# Patient Record
Sex: FEMALE | Race: WHITE | NOT HISPANIC OR LATINO | Employment: OTHER | URBAN - METROPOLITAN AREA
[De-identification: names, ages, dates, MRNs, and addresses within clinical notes are randomized per-mention and may not be internally consistent; named-entity substitution may affect disease eponyms.]

---

## 2018-09-12 ENCOUNTER — CONVERSION ENCOUNTER (OUTPATIENT)
Dept: NEUROLOGY | Facility: CLINIC | Age: 63
End: 2018-09-12

## 2018-09-12 ENCOUNTER — OFFICE VISIT CONVERTED (OUTPATIENT)
Dept: NEUROSURGERY | Facility: CLINIC | Age: 63
End: 2018-09-12
Attending: PHYSICIAN ASSISTANT

## 2019-03-07 ENCOUNTER — APPOINTMENT (OUTPATIENT)
Dept: CT IMAGING | Facility: HOSPITAL | Age: 64
End: 2019-03-07

## 2019-03-07 ENCOUNTER — HOSPITAL ENCOUNTER (INPATIENT)
Facility: HOSPITAL | Age: 64
LOS: 2 days | Discharge: HOME | End: 2019-03-09
Attending: EMERGENCY MEDICINE | Admitting: INTERNAL MEDICINE

## 2019-03-07 ENCOUNTER — APPOINTMENT (OUTPATIENT)
Dept: MRI IMAGING | Facility: HOSPITAL | Age: 64
End: 2019-03-07

## 2019-03-07 ENCOUNTER — APPOINTMENT (OUTPATIENT)
Dept: GENERAL RADIOLOGY | Facility: HOSPITAL | Age: 64
End: 2019-03-07

## 2019-03-07 DIAGNOSIS — I63.9 CEREBROVASCULAR ACCIDENT (CVA), UNSPECIFIED MECHANISM (HCC): Primary | ICD-10-CM

## 2019-03-07 DIAGNOSIS — R29.898 WEAKNESS OF LEFT LOWER EXTREMITY: ICD-10-CM

## 2019-03-07 DIAGNOSIS — I10 HYPERTENSION, UNSPECIFIED TYPE: ICD-10-CM

## 2019-03-07 DIAGNOSIS — R29.898 WEAKNESS OF LEFT UPPER EXTREMITY: ICD-10-CM

## 2019-03-07 LAB
ABO GROUP BLD: NORMAL
ALBUMIN SERPL-MCNC: 4.3 G/DL (ref 3.5–5.2)
ALBUMIN/GLOB SERPL: 1.4 G/DL
ALP SERPL-CCNC: 80 U/L (ref 39–117)
ALT SERPL W P-5'-P-CCNC: 32 U/L (ref 1–33)
ANION GAP SERPL CALCULATED.3IONS-SCNC: 11.7 MMOL/L
APTT PPP: 27.5 SECONDS (ref 22.7–35.4)
AST SERPL-CCNC: 27 U/L (ref 1–32)
BASOPHILS # BLD AUTO: 0.04 10*3/MM3 (ref 0–0.2)
BASOPHILS NFR BLD AUTO: 0.6 % (ref 0–1.5)
BILIRUB SERPL-MCNC: 0.4 MG/DL (ref 0.1–1.2)
BILIRUB UR QL STRIP: NEGATIVE
BLD GP AB SCN SERPL QL: NEGATIVE
BUN BLD-MCNC: 7 MG/DL (ref 8–23)
BUN/CREAT SERPL: 9 (ref 7–25)
CALCIUM SPEC-SCNC: 9.6 MG/DL (ref 8.6–10.5)
CHLORIDE SERPL-SCNC: 99 MMOL/L (ref 98–107)
CLARITY UR: CLEAR
CO2 SERPL-SCNC: 23.3 MMOL/L (ref 22–29)
COLOR UR: YELLOW
CREAT BLD-MCNC: 0.78 MG/DL (ref 0.57–1)
DEPRECATED RDW RBC AUTO: 45.2 FL (ref 37–54)
EOSINOPHIL # BLD AUTO: 0.02 10*3/MM3 (ref 0–0.4)
EOSINOPHIL NFR BLD AUTO: 0.3 % (ref 0.3–6.2)
ERYTHROCYTE [DISTWIDTH] IN BLOOD BY AUTOMATED COUNT: 13.2 % (ref 12.3–15.4)
GFR SERPL CREATININE-BSD FRML MDRD: 75 ML/MIN/1.73
GLOBULIN UR ELPH-MCNC: 3.1 GM/DL
GLUCOSE BLD-MCNC: 120 MG/DL (ref 65–99)
GLUCOSE BLDC GLUCOMTR-MCNC: 100 MG/DL (ref 70–130)
GLUCOSE BLDC GLUCOMTR-MCNC: 99 MG/DL (ref 70–130)
GLUCOSE UR STRIP-MCNC: NEGATIVE MG/DL
HCT VFR BLD AUTO: 44.1 % (ref 34–46.6)
HGB BLD-MCNC: 14.8 G/DL (ref 12–15.9)
HGB UR QL STRIP.AUTO: NEGATIVE
HOLD SPECIMEN: NORMAL
HOLD SPECIMEN: NORMAL
IMM GRANULOCYTES # BLD AUTO: 0.03 10*3/MM3 (ref 0–0.05)
IMM GRANULOCYTES NFR BLD AUTO: 0.4 % (ref 0–0.5)
INR PPP: 1.01 (ref 0.9–1.1)
KETONES UR QL STRIP: NEGATIVE
LEUKOCYTE ESTERASE UR QL STRIP.AUTO: NEGATIVE
LYMPHOCYTES # BLD AUTO: 1.57 10*3/MM3 (ref 0.7–3.1)
LYMPHOCYTES NFR BLD AUTO: 22.3 % (ref 19.6–45.3)
MAGNESIUM SERPL-MCNC: 2.1 MG/DL (ref 1.6–2.4)
MCH RBC QN AUTO: 31.6 PG (ref 26.6–33)
MCHC RBC AUTO-ENTMCNC: 33.6 G/DL (ref 31.5–35.7)
MCV RBC AUTO: 94.2 FL (ref 79–97)
MONOCYTES # BLD AUTO: 0.47 10*3/MM3 (ref 0.1–0.9)
MONOCYTES NFR BLD AUTO: 6.7 % (ref 5–12)
NEUTROPHILS # BLD AUTO: 4.91 10*3/MM3 (ref 1.4–7)
NEUTROPHILS NFR BLD AUTO: 69.7 % (ref 42.7–76)
NITRITE UR QL STRIP: NEGATIVE
NRBC BLD AUTO-RTO: 0 /100 WBC (ref 0–0)
PH UR STRIP.AUTO: <=5 [PH] (ref 5–8)
PLATELET # BLD AUTO: 228 10*3/MM3 (ref 140–450)
PMV BLD AUTO: 9.8 FL (ref 6–12)
POTASSIUM BLD-SCNC: 4.5 MMOL/L (ref 3.5–5.2)
PROT SERPL-MCNC: 7.4 G/DL (ref 6–8.5)
PROT UR QL STRIP: NEGATIVE
PROTHROMBIN TIME: 13.1 SECONDS (ref 11.7–14.2)
RBC # BLD AUTO: 4.68 10*6/MM3 (ref 3.77–5.28)
RH BLD: POSITIVE
SODIUM BLD-SCNC: 134 MMOL/L (ref 136–145)
SP GR UR STRIP: >=1.03 (ref 1–1.03)
T&S EXPIRATION DATE: NORMAL
TROPONIN T SERPL-MCNC: <0.01 NG/ML (ref 0–0.03)
TSH SERPL DL<=0.05 MIU/L-ACNC: 1.23 MIU/ML (ref 0.27–4.2)
UROBILINOGEN UR QL STRIP: NORMAL
WBC NRBC COR # BLD: 7.04 10*3/MM3 (ref 3.4–10.8)
WHOLE BLOOD HOLD SPECIMEN: NORMAL

## 2019-03-07 PROCEDURE — 80053 COMPREHEN METABOLIC PANEL: CPT | Performed by: EMERGENCY MEDICINE

## 2019-03-07 PROCEDURE — 0 IOPAMIDOL PER 1 ML: Performed by: EMERGENCY MEDICINE

## 2019-03-07 PROCEDURE — 86850 RBC ANTIBODY SCREEN: CPT | Performed by: EMERGENCY MEDICINE

## 2019-03-07 PROCEDURE — 70551 MRI BRAIN STEM W/O DYE: CPT

## 2019-03-07 PROCEDURE — 86901 BLOOD TYPING SEROLOGIC RH(D): CPT | Performed by: EMERGENCY MEDICINE

## 2019-03-07 PROCEDURE — 99285 EMERGENCY DEPT VISIT HI MDM: CPT

## 2019-03-07 PROCEDURE — 82565 ASSAY OF CREATININE: CPT

## 2019-03-07 PROCEDURE — 3E03317 INTRODUCTION OF OTHER THROMBOLYTIC INTO PERIPHERAL VEIN, PERCUTANEOUS APPROACH: ICD-10-PCS | Performed by: EMERGENCY MEDICINE

## 2019-03-07 PROCEDURE — 82962 GLUCOSE BLOOD TEST: CPT

## 2019-03-07 PROCEDURE — 84443 ASSAY THYROID STIM HORMONE: CPT | Performed by: EMERGENCY MEDICINE

## 2019-03-07 PROCEDURE — 85730 THROMBOPLASTIN TIME PARTIAL: CPT | Performed by: EMERGENCY MEDICINE

## 2019-03-07 PROCEDURE — 83735 ASSAY OF MAGNESIUM: CPT | Performed by: EMERGENCY MEDICINE

## 2019-03-07 PROCEDURE — 86900 BLOOD TYPING SEROLOGIC ABO: CPT | Performed by: EMERGENCY MEDICINE

## 2019-03-07 PROCEDURE — 70496 CT ANGIOGRAPHY HEAD: CPT

## 2019-03-07 PROCEDURE — 25010000002 ALTEPLASE PER 1 MG: Performed by: EMERGENCY MEDICINE

## 2019-03-07 PROCEDURE — 25010000002 ALTEPLASE PER 1 MG

## 2019-03-07 PROCEDURE — 0042T HC CT CEREBRAL PERFUSION W/WO CONTRAST: CPT

## 2019-03-07 PROCEDURE — 71045 X-RAY EXAM CHEST 1 VIEW: CPT

## 2019-03-07 PROCEDURE — 85025 COMPLETE CBC W/AUTO DIFF WBC: CPT | Performed by: EMERGENCY MEDICINE

## 2019-03-07 PROCEDURE — 85610 PROTHROMBIN TIME: CPT | Performed by: EMERGENCY MEDICINE

## 2019-03-07 PROCEDURE — 81003 URINALYSIS AUTO W/O SCOPE: CPT | Performed by: EMERGENCY MEDICINE

## 2019-03-07 PROCEDURE — 84484 ASSAY OF TROPONIN QUANT: CPT | Performed by: EMERGENCY MEDICINE

## 2019-03-07 PROCEDURE — 99233 SBSQ HOSP IP/OBS HIGH 50: CPT | Performed by: PSYCHIATRY & NEUROLOGY

## 2019-03-07 PROCEDURE — 93010 ELECTROCARDIOGRAM REPORT: CPT | Performed by: INTERNAL MEDICINE

## 2019-03-07 PROCEDURE — 25010000002 ONDANSETRON PER 1 MG: Performed by: INTERNAL MEDICINE

## 2019-03-07 PROCEDURE — 70498 CT ANGIOGRAPHY NECK: CPT

## 2019-03-07 PROCEDURE — 93005 ELECTROCARDIOGRAM TRACING: CPT | Performed by: EMERGENCY MEDICINE

## 2019-03-07 PROCEDURE — 25010000002 ONDANSETRON PER 1 MG

## 2019-03-07 RX ORDER — ONDANSETRON 2 MG/ML
4 INJECTION INTRAMUSCULAR; INTRAVENOUS EVERY 6 HOURS PRN
Status: DISCONTINUED | OUTPATIENT
Start: 2019-03-07 | End: 2019-03-09 | Stop reason: HOSPADM

## 2019-03-07 RX ORDER — SODIUM CHLORIDE 0.9 % (FLUSH) 0.9 %
3-10 SYRINGE (ML) INJECTION AS NEEDED
Status: DISCONTINUED | OUTPATIENT
Start: 2019-03-07 | End: 2019-03-09 | Stop reason: HOSPADM

## 2019-03-07 RX ORDER — ATORVASTATIN CALCIUM 80 MG/1
80 TABLET, FILM COATED ORAL NIGHTLY
Status: DISCONTINUED | OUTPATIENT
Start: 2019-03-07 | End: 2019-03-09 | Stop reason: HOSPADM

## 2019-03-07 RX ORDER — AMITRIPTYLINE HYDROCHLORIDE 25 MG/1
25 TABLET, FILM COATED ORAL NIGHTLY
COMMUNITY

## 2019-03-07 RX ORDER — SODIUM CHLORIDE 9 MG/ML
75 INJECTION, SOLUTION INTRAVENOUS CONTINUOUS
Status: DISCONTINUED | OUTPATIENT
Start: 2019-03-07 | End: 2019-03-08

## 2019-03-07 RX ORDER — ONDANSETRON 2 MG/ML
4 INJECTION INTRAMUSCULAR; INTRAVENOUS ONCE
Status: COMPLETED | OUTPATIENT
Start: 2019-03-07 | End: 2019-03-07

## 2019-03-07 RX ORDER — SODIUM CHLORIDE 0.9 % (FLUSH) 0.9 %
10 SYRINGE (ML) INJECTION AS NEEDED
Status: DISCONTINUED | OUTPATIENT
Start: 2019-03-07 | End: 2019-03-09 | Stop reason: HOSPADM

## 2019-03-07 RX ORDER — OMEPRAZOLE 20 MG/1
40 CAPSULE, DELAYED RELEASE ORAL DAILY
COMMUNITY

## 2019-03-07 RX ORDER — SODIUM CHLORIDE 9 MG/ML
100 INJECTION, SOLUTION INTRAVENOUS ONCE
Status: COMPLETED | OUTPATIENT
Start: 2019-03-07 | End: 2019-03-07

## 2019-03-07 RX ORDER — ASPIRIN 300 MG/1
300 SUPPOSITORY RECTAL DAILY
Status: DISCONTINUED | OUTPATIENT
Start: 2019-03-08 | End: 2019-03-08

## 2019-03-07 RX ORDER — ASPIRIN 325 MG
TABLET ORAL
Status: DISCONTINUED
Start: 2019-03-07 | End: 2019-03-07 | Stop reason: WASHOUT

## 2019-03-07 RX ORDER — ASPIRIN 325 MG
325 TABLET ORAL DAILY
Status: DISCONTINUED | OUTPATIENT
Start: 2019-03-08 | End: 2019-03-08

## 2019-03-07 RX ORDER — ACETAMINOPHEN 325 MG/1
650 TABLET ORAL EVERY 4 HOURS PRN
Status: DISCONTINUED | OUTPATIENT
Start: 2019-03-07 | End: 2019-03-09 | Stop reason: HOSPADM

## 2019-03-07 RX ORDER — SODIUM CHLORIDE 0.9 % (FLUSH) 0.9 %
3 SYRINGE (ML) INJECTION EVERY 12 HOURS SCHEDULED
Status: DISCONTINUED | OUTPATIENT
Start: 2019-03-07 | End: 2019-03-09 | Stop reason: HOSPADM

## 2019-03-07 RX ORDER — ACETAMINOPHEN 325 MG/1
650 TABLET ORAL EVERY 4 HOURS PRN
Status: DISCONTINUED | OUTPATIENT
Start: 2019-03-07 | End: 2019-03-07 | Stop reason: SDUPTHER

## 2019-03-07 RX ADMIN — SODIUM CHLORIDE 500 ML: 9 INJECTION, SOLUTION INTRAVENOUS at 13:09

## 2019-03-07 RX ADMIN — ONDANSETRON HYDROCHLORIDE 4 MG: 2 SOLUTION INTRAMUSCULAR; INTRAVENOUS at 16:53

## 2019-03-07 RX ADMIN — ONDANSETRON 4 MG: 2 INJECTION INTRAMUSCULAR; INTRAVENOUS at 12:57

## 2019-03-07 RX ADMIN — SODIUM CHLORIDE, PRESERVATIVE FREE 3 ML: 5 INJECTION INTRAVENOUS at 19:58

## 2019-03-07 RX ADMIN — IOPAMIDOL 150 ML: 755 INJECTION, SOLUTION INTRAVENOUS at 11:38

## 2019-03-07 RX ADMIN — ATORVASTATIN CALCIUM 80 MG: 80 TABLET, FILM COATED ORAL at 20:00

## 2019-03-07 RX ADMIN — SODIUM CHLORIDE, PRESERVATIVE FREE 3 ML: 5 INJECTION INTRAVENOUS at 14:16

## 2019-03-07 RX ADMIN — ALTEPLASE 6.35 MG: KIT at 11:50

## 2019-03-07 RX ADMIN — SODIUM CHLORIDE 75 ML/HR: 9 INJECTION, SOLUTION INTRAVENOUS at 14:16

## 2019-03-07 RX ADMIN — Medication 3 ML: at 20:30

## 2019-03-07 RX ADMIN — ALTEPLASE 57.11 MG: KIT at 11:54

## 2019-03-07 RX ADMIN — SODIUM CHLORIDE, PRESERVATIVE FREE 3 ML: 5 INJECTION INTRAVENOUS at 20:29

## 2019-03-07 RX ADMIN — SODIUM CHLORIDE 100 ML/HR: 9 INJECTION, SOLUTION INTRAVENOUS at 12:18

## 2019-03-07 NOTE — PLAN OF CARE
Problem: Patient Care Overview  Goal: Plan of Care Review  Outcome: Ongoing (interventions implemented as appropriate)   03/07/19 1802   Coping/Psychosocial   Plan of Care Reviewed With patient   Plan of Care Review   Progress improving   Coping/Psychosocial   Patient Agreement with Plan of Care agrees   OTHER   Outcome Summary Patient still having weakness on the left side of the body, states tingling and loss of sensation to left side of body, patient alert and oriented, slow to answer some questions, answers questions correctly, hemodynamically stable at this time, no new deficits seen after TPA given, will continue monitoring.

## 2019-03-07 NOTE — ED NOTES
"Provider at bedside at time of arrival.     Team d paged at this time     Pt reports onset of symptoms 0800 this morning \"felt like everything was leaving me\" in reference to her left side starting to feel weak       Yajaira Thompson, RN  03/07/19 5906    "

## 2019-03-07 NOTE — ED NOTES
Attempt to call report was unsuccessful. Will have icu rn call this rn back      Yajaira Thompson, RN  03/07/19 9546

## 2019-03-07 NOTE — ED TRIAGE NOTES
Patient to ED via EMS from PCP office, patient c/o left arm, left leg weakness and blurred vision onset 0800 today

## 2019-03-07 NOTE — H&P
.     Admission Note    Patient Identification:  Araceli Cantor  63 y.o.  female  1955  [id number]            CC: Weak left side    History of Present Illness:  Patient is a very nice 63-year-old female without much previous medical history other than reflux and hypertension.  She only took medications for reflux and amitriptyline.  She developed sudden onset left sided weakness.  She was evaluated in the emergency room with a negative CT head and CTA.  She was given TPA and recommended that she be admitted to the ICU.  I have been asked to admit her due to the need for frequent neuro checks.    On interview the patient has no difficulty with repetition of speech recalling facts date location and her name.  She complains of some weakness however is able to move bilateral extremities.  She says it is weaker on her left side.  No sniffing headache no change in her vision.  No chest pain or shortness of breath    Past Medical History:   Diagnosis Date   • Back pain        Past Surgical History:   Procedure Laterality Date   • APPENDECTOMY     •  SECTION     • EYE SURGERY     • GALLBLADDER SURGERY          Social History     Socioeconomic History   • Marital status:      Spouse name: Not on file   • Number of children: Not on file   • Years of education: Not on file   • Highest education level: Not on file   Social Needs   • Financial resource strain: Not on file   • Food insecurity - worry: Not on file   • Food insecurity - inability: Not on file   • Transportation needs - medical: Not on file   • Transportation needs - non-medical: Not on file   Occupational History   • Not on file   Tobacco Use   • Smoking status: Current Some Day Smoker   • Smokeless tobacco: Never Used   Substance and Sexual Activity   • Alcohol use: Yes   • Drug use: Defer   • Sexual activity: Defer   Other Topics Concern   • Not on file   Social History Narrative   • Not on file       History reviewed. No pertinent family  history.    Medications Prior to Admission   Medication Sig Dispense Refill Last Dose   • amitriptyline (ELAVIL) 25 MG tablet Take 25 mg by mouth Every Night.   3/6/2019 at Unknown time   • omeprazole (priLOSEC) 20 MG capsule Take 40 mg by mouth Daily.   3/6/2019 at Unknown time       No Known Allergies    Review of Systems:  CONSTITUTIONAL:  Denies fevers or chills  EYE:  No new vision changes  EAR:  No change in hearing  CARDIAC:  No chest pain  PULMONARY:  No productive cough or shortness of breath  GI:  No diarrhea, hematemesis or hematochezia,  RENAL:  No dysuria or urinary frequency  MUSCULOSKELETAL: Left-sided weakness  ENDOCRINE:  No heat or cold intolerance  INTEGUMENTARY: No skin rashes  NEUROLOGICAL: Left-sided weakness  PSYCHIATRIC:  No new anxiety or depression  12 system review of systems performed and all else negative    Physical Exam:  Vitals:  Vitals:    03/07/19 1720 03/07/19 1733 03/07/19 1803 03/07/19 1833   BP:  165/79 156/83 127/73   BP Location:       Pulse: 93 92 94 101   Resp:       Temp:       TempSrc:       SpO2: 95% 93% 94% 95%   Weight:       Height:               Body mass index is 34.35 kg/m².    Intake/Output Summary (Last 24 hours) at 3/7/2019 1855  Last data filed at 3/7/2019 1800  Gross per 24 hour   Intake 1360 ml   Output 700 ml   Net 660 ml       Exam:  GENERAL:  NAD, Aox3  HEENT:  EOMI, nonicteric sclera, no JVD  PULMONARY:    CTAB, no wheeze, no crackles, no rhonchi, symmetric air entry  CARDIAC:  RRR no MRG, S1 S2  ABD: Mild truncal obesity soft nontender BS+  EXT: no c/c/e, pulses symmetric 2+ bilaterally  NEURO:  CNs II-XII intact, no focal deficits  SKIN: no lesions, no rash  PSYCH: appropriate mood  LYMPH: no cervical LAD    Scheduled meds:    [START ON 3/8/2019] aspirin 325 mg Oral Daily   Or      [START ON 3/8/2019] aspirin 300 mg Rectal Daily   atorvastatin 80 mg Oral Nightly   sodium chloride 3 mL Intravenous Q12H   sodium chloride 3 mL Intravenous Q12H       Data  Review:   I reviewed the patient's medications and new clinical results.  Lab Results   Component Value Date    CALCIUM 9.6 03/07/2019     Results from last 7 days   Lab Units 03/07/19  1121 03/07/19  1120   AST (SGOT) U/L  --  27   MAGNESIUM mg/dL  --  2.1   SODIUM mmol/L  --  134*   POTASSIUM mmol/L  --  4.5   CHLORIDE mmol/L  --  99   CO2 mmol/L  --  23.3   BUN mg/dL  --  7*   CREATININE mg/dL  --  0.78   GLUCOSE mg/dL  --  120*   CALCIUM mg/dL  --  9.6   WBC 10*3/mm3 7.04  --    HEMOGLOBIN g/dL 14.8  --    PLATELETS 10*3/mm3 228  --    ALT (SGPT) U/L  --  32     Results from last 7 days   Lab Units 03/07/19  1120   TROPONIN T ng/mL <0.010         Estimated Creatinine Clearance: 64.7 mL/min (by C-G formula based on SCr of 0.78 mg/dL).    IMAGING:  I have reviewed all imaging studies since my last documentation.  Imaging Results (most recent)     Procedure Component Value Units Date/Time    CT Cerebral Perfusion With & Without Contrast [[id number]] Collected:  03/07/19 1308     Updated:  03/07/19 1308    Narrative:       CT ANGIOGRAM OF THE HEAD AND NECK WITH CONTRAST AND CT PERFUSION STUDY     CLINICAL HISTORY:Cerebral ischemia.     TECHNIQUE: The CT scan of the head was performed with 3 mm axial images  without the use of IV contrast. Subsequently, a CT perfusion study was  performed with a bolus administration of IV contrast and construction      standard perfusion maps. A CT angiogram of the head and neck was  performed with 1 mm axial images following administration of IV  contrast. Sagittal, coronal, and 3-dimensional reconstructed images were  obtained. Finally, a delayed postcontrast head CT was performed with 3  mm axial images.     FINDINGS:     NONCONTRAST HEAD CT: The noncontrast head CT demonstrates no evidence  for an area of acute completed infarction. The ventricles, sulci, and  cisterns are age appropriate. The gray-white matter differentiation is  within normal limits. The basal ganglia and  thalami are unremarkable in  appearance. The posterior fossa structures are within normal limits.     CT PERFUSION STUDY: No significant asymmetries are appreciated in either  the time to maximum enhancement or the cerebral blood flow maps.     CTA HEAD: There is no evidence for a large vessel occlusion. The  cavernous internal carotid arteries are remarkable for mild  atherosclerotic changes. The middle and anterior cerebral arteries are  unremarkable. The vertebral arteries, basilar artery, and posterior  cerebral arteries are within normal limits.     CTA NECK: There is a classic configuration of the aortic arch. There is  no significant great vessel origin stenosis. There is no significant  NASCET stenosis appreciated within either common carotid artery  bifurcation or proximal internal carotid. The vertebral arteries are  remarkable for a mild stenosis at the origin of the left vertebral.     Incidental note is made of a thyroid nodule within the left lobe  measuring up to 1.2 cm in diameter.        DELAYED POSTCONTRAST HEAD CT: No abnormal areas of contrast enhancement  are noted.       Impression:          There is no evidence for an acute completed infarct on either the  noncontrast head CT or the CT perfusion study.     There is no evidence for large vessel occlusion on the CT angiogram of  the head.     There is no NASCET stenosis within either common carotid artery  bifurcation or proximal internal carotid.     Mild stenosis is seen at the origin of the left vertebral artery.     Incidental note is made of an indeterminate hypodense nodule within the  left lobe of the thyroid measuring up to 1.2 cm in diameter. Further  evaluation is recommended with thyroid ultrasound along with potential  tissue sampling.     The findings of the noncontrast head CT were discussed with Dr. Bassett  on 03/07/2019 at approximately 11:29 AM. The findings of the CT  angiogram and CT perfusion study were discussed with   Serjio on  03/07/2019 at approximately 11:55 AM. The findings of the thyroid nodule  were discussed with .         Radiation dose reduction techniques were utilized, including automated  exposure control and exposure modulation based on body size.          CT Angiogram Neck With & Without Contrast [[id number]] Collected:  03/07/19 1308     Updated:  03/07/19 1308    Narrative:       CT ANGIOGRAM OF THE HEAD AND NECK WITH CONTRAST AND CT PERFUSION STUDY     CLINICAL HISTORY:Cerebral ischemia.     TECHNIQUE: The CT scan of the head was performed with 3 mm axial images  without the use of IV contrast. Subsequently, a CT perfusion study was  performed with a bolus administration of IV contrast and construction      standard perfusion maps. A CT angiogram of the head and neck was  performed with 1 mm axial images following administration of IV  contrast. Sagittal, coronal, and 3-dimensional reconstructed images were  obtained. Finally, a delayed postcontrast head CT was performed with 3  mm axial images.     FINDINGS:     NONCONTRAST HEAD CT: The noncontrast head CT demonstrates no evidence  for an area of acute completed infarction. The ventricles, sulci, and  cisterns are age appropriate. The gray-white matter differentiation is  within normal limits. The basal ganglia and thalami are unremarkable in  appearance. The posterior fossa structures are within normal limits.     CT PERFUSION STUDY: No significant asymmetries are appreciated in either  the time to maximum enhancement or the cerebral blood flow maps.     CTA HEAD: There is no evidence for a large vessel occlusion. The  cavernous internal carotid arteries are remarkable for mild  atherosclerotic changes. The middle and anterior cerebral arteries are  unremarkable. The vertebral arteries, basilar artery, and posterior  cerebral arteries are within normal limits.     CTA NECK: There is a classic configuration of the aortic arch. There is  no significant great  vessel origin stenosis. There is no significant  NASCET stenosis appreciated within either common carotid artery  bifurcation or proximal internal carotid. The vertebral arteries are  remarkable for a mild stenosis at the origin of the left vertebral.     Incidental note is made of a thyroid nodule within the left lobe  measuring up to 1.2 cm in diameter.        DELAYED POSTCONTRAST HEAD CT: No abnormal areas of contrast enhancement  are noted.       Impression:          There is no evidence for an acute completed infarct on either the  noncontrast head CT or the CT perfusion study.     There is no evidence for large vessel occlusion on the CT angiogram of  the head.     There is no NASCET stenosis within either common carotid artery  bifurcation or proximal internal carotid.     Mild stenosis is seen at the origin of the left vertebral artery.     Incidental note is made of an indeterminate hypodense nodule within the  left lobe of the thyroid measuring up to 1.2 cm in diameter. Further  evaluation is recommended with thyroid ultrasound along with potential  tissue sampling.     The findings of the noncontrast head CT were discussed with Dr. Bassett  on 03/07/2019 at approximately 11:29 AM. The findings of the CT  angiogram and CT perfusion study were discussed with Dr. Bassett on  03/07/2019 at approximately 11:55 AM. The findings of the thyroid nodule  were discussed with .         Radiation dose reduction techniques were utilized, including automated  exposure control and exposure modulation based on body size.          CT Angiogram Head With & Without Contrast [[id number]] Collected:  03/07/19 1308     Updated:  03/07/19 1308    Narrative:       CT ANGIOGRAM OF THE HEAD AND NECK WITH CONTRAST AND CT PERFUSION STUDY     CLINICAL HISTORY:Cerebral ischemia.     TECHNIQUE: The CT scan of the head was performed with 3 mm axial images  without the use of IV contrast. Subsequently, a CT perfusion study  was  performed with a bolus administration of IV contrast and construction      standard perfusion maps. A CT angiogram of the head and neck was  performed with 1 mm axial images following administration of IV  contrast. Sagittal, coronal, and 3-dimensional reconstructed images were  obtained. Finally, a delayed postcontrast head CT was performed with 3  mm axial images.     FINDINGS:     NONCONTRAST HEAD CT: The noncontrast head CT demonstrates no evidence  for an area of acute completed infarction. The ventricles, sulci, and  cisterns are age appropriate. The gray-white matter differentiation is  within normal limits. The basal ganglia and thalami are unremarkable in  appearance. The posterior fossa structures are within normal limits.     CT PERFUSION STUDY: No significant asymmetries are appreciated in either  the time to maximum enhancement or the cerebral blood flow maps.     CTA HEAD: There is no evidence for a large vessel occlusion. The  cavernous internal carotid arteries are remarkable for mild  atherosclerotic changes. The middle and anterior cerebral arteries are  unremarkable. The vertebral arteries, basilar artery, and posterior  cerebral arteries are within normal limits.     CTA NECK: There is a classic configuration of the aortic arch. There is  no significant great vessel origin stenosis. There is no significant  NASCET stenosis appreciated within either common carotid artery  bifurcation or proximal internal carotid. The vertebral arteries are  remarkable for a mild stenosis at the origin of the left vertebral.     Incidental note is made of a thyroid nodule within the left lobe  measuring up to 1.2 cm in diameter.        DELAYED POSTCONTRAST HEAD CT: No abnormal areas of contrast enhancement  are noted.       Impression:          There is no evidence for an acute completed infarct on either the  noncontrast head CT or the CT perfusion study.     There is no evidence for large vessel occlusion on  the CT angiogram of  the head.     There is no NASCET stenosis within either common carotid artery  bifurcation or proximal internal carotid.     Mild stenosis is seen at the origin of the left vertebral artery.     Incidental note is made of an indeterminate hypodense nodule within the  left lobe of the thyroid measuring up to 1.2 cm in diameter. Further  evaluation is recommended with thyroid ultrasound along with potential  tissue sampling.     The findings of the noncontrast head CT were discussed with Dr. Bassett  on 03/07/2019 at approximately 11:29 AM. The findings of the CT  angiogram and CT perfusion study were discussed with Dr. Bassett on  03/07/2019 at approximately 11:55 AM. The findings of the thyroid nodule  were discussed with .         Radiation dose reduction techniques were utilized, including automated  exposure control and exposure modulation based on body size.          XR Chest 1 View [[id number]] Collected:  03/07/19 1245     Updated:  03/07/19 1249    Narrative:       CHEST 3/17/2019 AT 12:42 PM     CLINICAL HISTORY: Possible acute CVA. Rule out aspiration.     The lungs are well-expanded and are free of infiltrates. There are no  pleural effusions. The heart is normal in size.     IMPRESSIONS: No evidence of acute disease within the chest.     This report was finalized on 3/7/2019 12:46 PM by Dr. Anson Villafuerte M.D.             ASSESSMENT /   PLAN:  Hyponatremia  Hypertension  GERD  Hyperglycemia  Acute stroke right subcortical    Admit to the ICU frequent neurological monitoring goal blood pressure less than 180 systolic 110 diastolic per stroke team.  MRI and transthoracic echocardiogram ordered IV fluids.  Standard TPA orders.  Have discussed with bedside nurse in ICU we will give some as needed medications for nauseousness.    Total critical care time was 40 minutes, excluding any separately billable procedure time.    Jeremi Caballero MD  [facility]  03/07/19  6:58 PM

## 2019-03-07 NOTE — ED NOTES
Pt in ct on zoll monitor, +portable oxygen, and RNx3. Dr. Loco at bedside shortly after arrival to CT.        Yajaira Thompson, RN  03/07/19 0822

## 2019-03-07 NOTE — ED PROVIDER NOTES
" EMERGENCY DEPARTMENT ENCOUNTER    CHIEF COMPLAINT  Chief Complaint: left sided extremity weakness  History given by: patient  History limited by: none  Room Number: I371/1  PMD: System, Provider Not In      HPI:  Pt is a 63 y.o. female who presents complaining of left sided extremity weakness that started suddenly at 0800 this morning shortly after she woke up. Pt states she was sitting on the edge of her bed when her strength \"drained out of her\". Pt was last normal this morning when she woke up. Pt drove herself to see her doctor and was brought to the ER via ambulance from there. Pt c/o bilateral blurry vision and dizziness. Pt denies HA, rectal bleeding, any recent head injuries or surgeries, and any recent flu-like symptoms. Pt was slightly hypertensive en route to the ED, per EMS. Pt has hx of GERD. Pt denies any hx of aneurysm.     Duration:  Since 0800 this morning  Onset: sudden  Timing: constant  Location: L-sided extremities  Radiation: none  Quality: L-sided extremity weakness  Intensity/Severity: moderate  Progression: unchanged  Associated Symptoms: bilateral blurry vision and dizziness  Aggravating Factors: none  Alleviating Factors: none  Previous Episodes: none  Treatment before arrival: Pt drove herself to see her doctor and was brought to the ER via ambulance from there.    PAST MEDICAL HISTORY  Active Ambulatory Problems     Diagnosis Date Noted   • No Active Ambulatory Problems     Resolved Ambulatory Problems     Diagnosis Date Noted   • No Resolved Ambulatory Problems     Past Medical History:   Diagnosis Date   • Back pain        PAST SURGICAL HISTORY  Past Surgical History:   Procedure Laterality Date   • APPENDECTOMY     •  SECTION     • EYE SURGERY     • GALLBLADDER SURGERY         FAMILY HISTORY  History reviewed. No pertinent family history.    SOCIAL HISTORY  Social History     Socioeconomic History   • Marital status:      Spouse name: Not on file   • Number of " children: Not on file   • Years of education: Not on file   • Highest education level: Not on file   Social Needs   • Financial resource strain: Not on file   • Food insecurity - worry: Not on file   • Food insecurity - inability: Not on file   • Transportation needs - medical: Not on file   • Transportation needs - non-medical: Not on file   Occupational History   • Not on file   Tobacco Use   • Smoking status: Current Some Day Smoker   • Smokeless tobacco: Never Used   Substance and Sexual Activity   • Alcohol use: Yes   • Drug use: Defer   • Sexual activity: Defer   Other Topics Concern   • Not on file   Social History Narrative   • Not on file       ALLERGIES  Patient has no known allergies.    REVIEW OF SYSTEMS  Review of Systems   Constitutional: Negative for fever.   HENT: Negative for sore throat.    Eyes: Positive for visual disturbance ( bilateral blurry vision).   Respiratory: Negative for cough and shortness of breath.    Cardiovascular: Negative for chest pain.   Gastrointestinal: Negative for abdominal pain, diarrhea and vomiting.   Genitourinary: Negative for dysuria.   Musculoskeletal: Negative for neck pain.   Skin: Negative for rash.   Neurological: Positive for dizziness and weakness ( L-sided extremity weakness). Negative for numbness and headaches.   Hematological: Negative.    Psychiatric/Behavioral: Negative.    All other systems reviewed and are negative.      PHYSICAL EXAM  ED Triage Vitals   Temp Pulse Resp BP SpO2   -- -- -- -- --      Temp src Heart Rate Source Patient Position BP Location FiO2 (%)   -- -- -- -- --       Physical Exam   Constitutional: She is oriented to person, place, and time and well-developed, well-nourished, and in no distress. No distress.   HENT:   Mouth/Throat: Mucous membranes are normal.   Eyes: EOM are normal.   Cardiovascular: Normal rate and regular rhythm. Exam reveals no gallop and no friction rub.   No murmur heard.  Pulmonary/Chest: Effort normal. No  respiratory distress. She has no wheezes. She has no rhonchi. She has no rales.   Breath sounds are symmetric.   Abdominal: Soft. She exhibits no distension. There is no tenderness. There is no guarding.   Musculoskeletal: Normal range of motion. She exhibits no deformity.   Neurological: She is alert and oriented to person, place, and time. She has intact cranial nerves. She displays facial symmetry and normal speech.   moving all extremities, no focal deficits  There is weakness of L upper extremity and L lower extremity with pronator drift and mild decreased sensation to L upper extremity and L lower extremity as compared to R side. There is no significant ataxia. There is no aphasia or dysarthria. NIH scale of 5.     Skin: Skin is warm and dry.   Psychiatric:   Calm, cooperative       LAB RESULTS  Lab Results (last 24 hours)     Procedure Component Value Units Date/Time    Comprehensive Metabolic Panel [[id number]]  (Abnormal) Collected:  03/07/19 1120    Specimen:  Blood Updated:  03/07/19 1202     Glucose 120 mg/dL      BUN 7 mg/dL      Creatinine 0.78 mg/dL      Sodium 134 mmol/L      Potassium 4.5 mmol/L      Chloride 99 mmol/L      CO2 23.3 mmol/L      Calcium 9.6 mg/dL      Total Protein 7.4 g/dL      Albumin 4.30 g/dL      ALT (SGPT) 32 U/L      AST (SGOT) 27 U/L      Alkaline Phosphatase 80 U/L      Total Bilirubin 0.4 mg/dL      eGFR Non African Amer 75 mL/min/1.73      Globulin 3.1 gm/dL      A/G Ratio 1.4 g/dL      BUN/Creatinine Ratio 9.0     Anion Gap 11.7 mmol/L     Narrative:       GFR Normal >60  Chronic Kidney Disease <60  Kidney Failure <15    Troponin [[id number]]  (Normal) Collected:  03/07/19 1120    Specimen:  Blood Updated:  03/07/19 1208     Troponin T <0.010 ng/mL     Narrative:       Troponin T Reference Range:  <= 0.03 ng/mL-   Negative for AMI  >0.03 ng/mL-     Abnormal for myocardial necrosis.  Clinicians would have to utilize clinical acumen, EKG, Troponin and serial changes to  determine if it is an Acute Myocardial Infarction or myocardial injury due to an underlying chronic condition.     Magnesium [[id number]]  (Normal) Collected:  03/07/19 1120    Specimen:  Blood Updated:  03/07/19 1200     Magnesium 2.1 mg/dL     TSH [[id number]]  (Normal) Collected:  03/07/19 1120    Specimen:  Blood Updated:  03/07/19 1212     TSH 1.230 mIU/mL     CBC & Differential [[id number]] Collected:  03/07/19 1121    Specimen:  Blood Updated:  03/07/19 1143    Narrative:       The following orders were created for panel order CBC & Differential.  Procedure                               Abnormality         Status                     ---------                               -----------         ------                     CBC Auto Differential[[id number]]        Normal              Final result                 Please view results for these tests on the individual orders.    Protime-INR [[id number]]  (Normal) Collected:  03/07/19 1121    Specimen:  Blood Updated:  03/07/19 1150     Protime 13.1 Seconds      INR 1.01    aPTT [[id number]]  (Normal) Collected:  03/07/19 1121    Specimen:  Blood Updated:  03/07/19 1150     PTT 27.5 seconds     CBC Auto Differential [[id number]]  (Normal) Collected:  03/07/19 1121    Specimen:  Blood Updated:  03/07/19 1143     WBC 7.04 10*3/mm3      RBC 4.68 10*6/mm3      Hemoglobin 14.8 g/dL      Hematocrit 44.1 %      MCV 94.2 fL      MCH 31.6 pg      MCHC 33.6 g/dL      RDW 13.2 %      RDW-SD 45.2 fl      MPV 9.8 fL      Platelets 228 10*3/mm3      Neutrophil % 69.7 %      Lymphocyte % 22.3 %      Monocyte % 6.7 %      Eosinophil % 0.3 %      Basophil % 0.6 %      Immature Grans % 0.4 %      Neutrophils, Absolute 4.91 10*3/mm3      Lymphocytes, Absolute 1.57 10*3/mm3      Monocytes, Absolute 0.47 10*3/mm3      Eosinophils, Absolute 0.02 10*3/mm3      Basophils, Absolute 0.04 10*3/mm3      Immature Grans, Absolute 0.03 10*3/mm3      nRBC 0.0 /100 WBC           I ordered the above labs and  reviewed the results    RADIOLOGY  CT Cerebral Perfusion With & Without Contrast   Preliminary Result       There is no evidence for an acute completed infarct on either the   noncontrast head CT or the CT perfusion study.       There is no evidence for large vessel occlusion on the CT angiogram of   the head.       There is no NASCET stenosis within either common carotid artery   bifurcation or proximal internal carotid.       Mild stenosis is seen at the origin of the left vertebral artery.       Incidental note is made of an indeterminate hypodense nodule within the   left lobe of the thyroid measuring up to 1.2 cm in diameter. Further   evaluation is recommended with thyroid ultrasound along with potential   tissue sampling.       The findings of the noncontrast head CT were discussed with Dr. Bassett   on 03/07/2019 at approximately 11:29 AM. The findings of the CT   angiogram and CT perfusion study were discussed with Dr. Bassett on   03/07/2019 at approximately 11:55 AM. The findings of the thyroid nodule   were discussed with .           Radiation dose reduction techniques were utilized, including automated   exposure control and exposure modulation based on body size.              CT Angiogram Neck With & Without Contrast   Preliminary Result       There is no evidence for an acute completed infarct on either the   noncontrast head CT or the CT perfusion study.       There is no evidence for large vessel occlusion on the CT angiogram of   the head.       There is no NASCET stenosis within either common carotid artery   bifurcation or proximal internal carotid.       Mild stenosis is seen at the origin of the left vertebral artery.       Incidental note is made of an indeterminate hypodense nodule within the   left lobe of the thyroid measuring up to 1.2 cm in diameter. Further   evaluation is recommended with thyroid ultrasound along with potential   tissue sampling.       The findings of the  noncontrast head CT were discussed with Dr. Bassett   on 03/07/2019 at approximately 11:29 AM. The findings of the CT   angiogram and CT perfusion study were discussed with Dr. Bassett on   03/07/2019 at approximately 11:55 AM. The findings of the thyroid nodule   were discussed with            Radiation dose reduction techniques were utilized, including automated   exposure control and exposure modulation based on body size.              CT Angiogram Head With & Without Contrast   Preliminary Result       There is no evidence for an acute completed infarct on either the   noncontrast head CT or the CT perfusion study.       There is no evidence for large vessel occlusion on the CT angiogram of   the head.       There is no NASCET stenosis within either common carotid artery   bifurcation or proximal internal carotid.       Mild stenosis is seen at the origin of the left vertebral artery.       Incidental note is made of an indeterminate hypodense nodule within the   left lobe of the thyroid measuring up to 1.2 cm in diameter. Further   evaluation is recommended with thyroid ultrasound along with potential   tissue sampling.       The findings of the noncontrast head CT were discussed with Dr. Bassett   on 03/07/2019 at approximately 11:29 AM. The findings of the CT   angiogram and CT perfusion study were discussed with Dr. Bassett on   03/07/2019 at approximately 11:55 AM. The findings of the thyroid nodule   were discussed with .           Radiation dose reduction techniques were utilized, including automated   exposure control and exposure modulation based on body size.              XR Chest 1 View   Final Result   IMPRESSIONS: No evidence of acute disease within the chest.   CT Head Without Contrast    (Results Pending)   MRI Brain Without Contrast    (Results Pending)        I ordered the above noted radiological studies. Interpreted by radiologist. Discussed with radiologist Dr. Cunningham. Pt has  incidental L thyroid nodule. He recommends follow up imaging and biopsy as needed. Reviewed by me in PACS.       PROCEDURES  Critical Care  Performed by: Devang Carranza MD  Authorized by: Devang Carranza MD     Critical care provider statement:     Critical care time (minutes):  38    Critical care time was exclusive of:  Separately billable procedures and treating other patients    Critical care was necessary to treat or prevent imminent or life-threatening deterioration of the following conditions:  CNS failure or compromise    Critical care was time spent personally by me on the following activities:  Development of treatment plan with patient or surrogate, discussions with consultants, evaluation of patient's response to treatment, examination of patient, obtaining history from patient or surrogate, review of old charts, re-evaluation of patient's condition, pulse oximetry, ordering and review of radiographic studies, ordering and performing treatments and interventions and ordering and review of laboratory studies    I assumed direction of critical care for this patient from another provider in my specialty: no        EKG          EKG time: 1227  Rhythm/Rate: NSR, rate 96  P waves and VT: nl  QRS, axis: nl  No acute ischemic changes and no STEMI.  ST and T waves: nl    Interpreted Contemporaneously by me, independently viewed. Unchanged compared to prior in July 1999.       PROGRESS AND CONSULTS     1115 Team D called and pt is a TPA candidate.     1121 CXR, EKG, CT Cerebral Perfusion, CTA Neck, CTA Head, Labs, and Troponin ordered for further evaluation. Call placed to Neurology.     1148 I spoke with Dr. Bassett (Neurologist) here in the ED. Dr. Bassett agrees that the pt is a TPA candidate and agrees with the plan to admit the pt to the ICU.     1149 Activase and IVF ordered for further tx and symptom management.     1154 Call placed to Pulmonology.     1223 Received a call from Dr. Caballero (Pulmonologist)  and discussed patient's case. Dr. Caballero agrees with the plan to admit the pt.     1225 Rechecked pt. Pt is well appearing and shows symptomatic improvement. Pt is alert and oriented. Discussed the plan to admit the pt. Pt understands and agrees with the plan, all questions answered.    1227 I spoke with pt's family in the hallway. I told the pt that her imaging studies showed no evidence of major stroke, but she likely had a minor stroke. All questions addressed.       MEDICAL DECISION MAKING  Results were reviewed/discussed with the patient and they were also made aware of online access. Pt also made aware that some labs, such as cultures, will not be resulted during ER visit and follow up with PMD is necessary.     MDM  Number of Diagnoses or Management Options  Cerebrovascular accident (CVA), unspecified mechanism (CMS/HCC):   Hypertension, unspecified type:   Weakness of left lower extremity:   Weakness of left upper extremity:   Diagnosis management comments: Patient with stroke-like symptoms presenting within the tPA window and without absolute contraindications. Neurology consulted immediately, and after initially unremarkable CT scans recommended tPA. Patient did show improvement in strength and blurry vision during her ED stay after tPA, and remains well-appearing without mental status changes. She has been hospitalized to the ICU. Patient and family updated on the course and plan and need for hospitalization.        Amount and/or Complexity of Data Reviewed  Clinical lab tests: ordered and reviewed (Troponin - <0.010)  Tests in the radiology section of CPT®: ordered and reviewed (CTA Neck - There is no evidence for an acute completed infarct on either the noncontrast head CT or the CT perfusion study.  )  Tests in the medicine section of CPT®: ordered and reviewed (EKG - see procedure note)  Discussion of test results with the performing providers: yes (Dr. Cunningham (Radiologist))  Obtain history from  someone other than the patient: yes (EMS)  Discuss the patient with other providers: yes (Dr. Bassett (Neurologist)   Dr. Caballero (Pulmonologist))  Independent visualization of images, tracings, or specimens: yes    Critical Care  Total time providing critical care: 30-74 minutes (38 minutes)         DIAGNOSIS  Final diagnoses:   Cerebrovascular accident (CVA), unspecified mechanism (CMS/HCC)   Weakness of left lower extremity   Weakness of left upper extremity   Hypertension, unspecified type       DISPOSITION  ADMISSION    Discussed treatment plan and reason for admission with pt/family and admitting physician.  Pt/family voiced understanding of the plan for admission for further testing/treatment as needed.         Latest Documented Vital Signs:  As of 2:06 PM  BP- (!) 188/105 HR- 89 Temp- 98.1 °F (36.7 °C) (Oral) O2 sat- 99%    --  Documentation assistance provided by brittney Castro for Dr. Carranza.  Information recorded by the scribe was done at my direction and has been verified and validated by me.          Jose Francisco Castro  03/07/19 0914       Devang Carranza MD  03/07/19 1417

## 2019-03-07 NOTE — CONSULTS
Neurology Consult Note    Consult Date: 3/7/2019    Referring MD: Jeremi Caballero MD    Reason for Consult I have been asked to see the patient in neurological consultation to render advice and opinion regarding acute stroke    Araceli Cantor is a 63 y.o. female with past medical history of hypertension, GERD, no prior history of stroke.  She presented with last known normal at 8 AM when she developed sudden onset of left arm and leg weakness.  She felt a sudden sensation come over her and then noticed she was dragging the leg and felt her left arm was clumsy.  She presented to the emergency department was found to have left-sided weakness with no speech problems, no neglect.  She was taken for team D imaging, CT head was negative, CTA, CT perfusion were negative.  TPA was given for suspected acute subcortical stroke.  She denies any associated vision loss or language difficulty.  She denies any heart palpitations or cardiac history in the past.    Past Medical/Surgical Hx:  Past Medical History:   Diagnosis Date   • Back pain      Past Surgical History:   Procedure Laterality Date   • APPENDECTOMY     •  SECTION     • EYE SURGERY     • GALLBLADDER SURGERY         Medications On Admission  Elavil 25 mg daily  Prilosec 20 mg daily    Allergies:  No Known Allergies    Social Hx:  Social History     Socioeconomic History   • Marital status:      Spouse name: Not on file   • Number of children: Not on file   • Years of education: Not on file   • Highest education level: Not on file   Social Needs   • Financial resource strain: Not on file   • Food insecurity - worry: Not on file   • Food insecurity - inability: Not on file   • Transportation needs - medical: Not on file   • Transportation needs - non-medical: Not on file   Occupational History   • Not on file   Tobacco Use   • Smoking status: Current Some Day Smoker   • Smokeless tobacco: Never Used   Substance and Sexual Activity   • Alcohol use:  "Yes   • Drug use: Defer   • Sexual activity: Defer   Other Topics Concern   • Not on file   Social History Narrative   • Not on file       Family Hx:  History reviewed. No pertinent family history.    Review of Systems  Constitutional: [No fevers, chills]  Eye: [No recent visual problems, eye discharge]  HEENT: [No ear pain, nasal congestion]  Respiratory: [No shortness of breath, cough]  Cardiovascular: [No Chest pain, palpitations]  Gastrointestinal: [No nausea, vomiting]  Genitourinary: [No hematuria, incontinence]  Hema/Lymph: [no nosebleeds, history of anticoagulation]  Endocrine: [Negative for excessive urination, heat or cold intolerance]  Musculoskeletal: [No back pain, neck pain]  Integumentary: [No rash, pruritus]  Neurologic: [+ Weakness, numbness]  Psychiatric: [No anxiety, depression]    Exam    BP (!) 165/120   Pulse (!) 15   Temp 98.5 °F (36.9 °C)   Resp 18   Ht 142.2 cm (56\")   Wt 70.5 kg (155 lb 6.8 oz)   SpO2 99%   BMI 34.85 kg/m²   gen: NAD, vitals reviewed  Eyes: fundus sharp with no papilledema or retinal hemorrhages  CVS: RRR, S1, S2  MS: oriented x3, recent/remote memory intact, normal attention/concentration, language intact, no neglect, normal fund of knowledge  CN: visual acuity grossly normal, visual fields full, PERRL, EOMI, facial sensation equal, no facial droop, hearing symmetric, palate elevates symmetrically, shoulder shrug equal, tongue midline  Motor: 4+/5 left arm and leg, normal tone, 5/5 right upper and lower extremity, normal tone e  Sensation: intact to vibration and temperature throughout  Reflexes: 2+ right upper and lower extremity, right plantar downgoing, 1+ left upper and lower extremity, left plantar mute  Coordination: Left upper extremity dysmetria  Gait: Unable to walk due to left-sided weakness    DATA:    Lab Results   Component Value Date    GLUCOSE 120 (H) 03/07/2019    CALCIUM 9.6 03/07/2019     (L) 03/07/2019    K 4.5 03/07/2019    CO2 23.3 " 03/07/2019    CL 99 03/07/2019    BUN 7 (L) 03/07/2019    CREATININE 0.78 03/07/2019    EGFRIFNONA 75 03/07/2019    BCR 9.0 03/07/2019    ANIONGAP 11.7 03/07/2019     Lab Results   Component Value Date    WBC 7.04 03/07/2019    HGB 14.8 03/07/2019    HCT 44.1 03/07/2019    MCV 94.2 03/07/2019     03/07/2019     No results found for: CHOL  No results found for: HDL  No results found for: LDL  No results found for: TRIG  No results found for: HGBA1C  Lab Results   Component Value Date    INR 1.01 03/07/2019    PROTIME 13.1 03/07/2019       Lab review: CBC, BMP normal    Imaging review: CT head, CTA, CT perfusion personally reviewed and discussed with the reading neuroradiologist .  CT head shows no acute stroke, CTA shows no large vessel occlusion, CT perfusion negative.    Impression:  1) acute stroke, right subcortical, due to small vessel disease  2) essential hypertension  3) gastroesophageal reflux disease    Comment: Clinically she has an acute right subcortical stroke.  Plan is to give TPA and monitor in ICU.  Will obtain brain MRI later today.    PLAN:  1.  Status post TPA  2.  Goal blood pressure less than 180/110  3.  MRI brain without, 2D echo  4.  500 cc bolus, IV fluids

## 2019-03-08 ENCOUNTER — APPOINTMENT (OUTPATIENT)
Dept: CT IMAGING | Facility: HOSPITAL | Age: 64
End: 2019-03-08

## 2019-03-08 ENCOUNTER — APPOINTMENT (OUTPATIENT)
Dept: CARDIOLOGY | Facility: HOSPITAL | Age: 64
End: 2019-03-08

## 2019-03-08 LAB
AORTIC DIMENSIONLESS INDEX: 0.8 (DI)
BH CV ECHO MEAS - ACS: 1.7 CM
BH CV ECHO MEAS - AO MAX PG (FULL): 6.6 MMHG
BH CV ECHO MEAS - AO MAX PG: 11.4 MMHG
BH CV ECHO MEAS - AO MEAN PG (FULL): 3 MMHG
BH CV ECHO MEAS - AO MEAN PG: 5 MMHG
BH CV ECHO MEAS - AO ROOT AREA (BSA CORRECTED): 1.5
BH CV ECHO MEAS - AO ROOT AREA: 4.5 CM^2
BH CV ECHO MEAS - AO ROOT DIAM: 2.4 CM
BH CV ECHO MEAS - AO V2 MAX: 169 CM/SEC
BH CV ECHO MEAS - AO V2 MEAN: 106 CM/SEC
BH CV ECHO MEAS - AO V2 VTI: 28.4 CM
BH CV ECHO MEAS - ASC AORTA: 2.6 CM
BH CV ECHO MEAS - AVA(I,A): 2.4 CM^2
BH CV ECHO MEAS - AVA(I,D): 2.4 CM^2
BH CV ECHO MEAS - AVA(V,A): 2 CM^2
BH CV ECHO MEAS - AVA(V,D): 2 CM^2
BH CV ECHO MEAS - BSA(HAYCOCK): 1.7 M^2
BH CV ECHO MEAS - BSA: 1.6 M^2
BH CV ECHO MEAS - BZI_BMI: 34.3 KILOGRAMS/M^2
BH CV ECHO MEAS - BZI_METRIC_HEIGHT: 142.2 CM
BH CV ECHO MEAS - BZI_METRIC_WEIGHT: 69.4 KG
BH CV ECHO MEAS - EDV(CUBED): 97.3 ML
BH CV ECHO MEAS - EDV(MOD-SP2): 63 ML
BH CV ECHO MEAS - EDV(MOD-SP4): 82 ML
BH CV ECHO MEAS - EDV(TEICH): 97.3 ML
BH CV ECHO MEAS - EF(CUBED): 69.4 %
BH CV ECHO MEAS - EF(MOD-BP): 58 %
BH CV ECHO MEAS - EF(MOD-SP2): 55.6 %
BH CV ECHO MEAS - EF(MOD-SP4): 59.8 %
BH CV ECHO MEAS - EF(TEICH): 61 %
BH CV ECHO MEAS - ESV(CUBED): 29.8 ML
BH CV ECHO MEAS - ESV(MOD-SP2): 28 ML
BH CV ECHO MEAS - ESV(MOD-SP4): 33 ML
BH CV ECHO MEAS - ESV(TEICH): 37.9 ML
BH CV ECHO MEAS - FS: 32.6 %
BH CV ECHO MEAS - IVS/LVPW: 1
BH CV ECHO MEAS - IVSD: 0.9 CM
BH CV ECHO MEAS - LAT PEAK E' VEL: 9.6 CM/SEC
BH CV ECHO MEAS - LV DIASTOLIC VOL/BSA (35-75): 51.8 ML/M^2
BH CV ECHO MEAS - LV MASS(C)D: 137.7 GRAMS
BH CV ECHO MEAS - LV MASS(C)DI: 86.9 GRAMS/M^2
BH CV ECHO MEAS - LV MAX PG: 4.8 MMHG
BH CV ECHO MEAS - LV MEAN PG: 2 MMHG
BH CV ECHO MEAS - LV SYSTOLIC VOL/BSA (12-30): 20.8 ML/M^2
BH CV ECHO MEAS - LV V1 MAX: 110 CM/SEC
BH CV ECHO MEAS - LV V1 MEAN: 71.6 CM/SEC
BH CV ECHO MEAS - LV V1 VTI: 22.1 CM
BH CV ECHO MEAS - LVIDD: 4.6 CM
BH CV ECHO MEAS - LVIDS: 3.1 CM
BH CV ECHO MEAS - LVLD AP2: 6.4 CM
BH CV ECHO MEAS - LVLD AP4: 7.4 CM
BH CV ECHO MEAS - LVLS AP2: 5.5 CM
BH CV ECHO MEAS - LVLS AP4: 6.3 CM
BH CV ECHO MEAS - LVOT AREA (M): 3.1 CM^2
BH CV ECHO MEAS - LVOT AREA: 3.1 CM^2
BH CV ECHO MEAS - LVOT DIAM: 2 CM
BH CV ECHO MEAS - LVPWD: 0.9 CM
BH CV ECHO MEAS - MED PEAK E' VEL: 8.5 CM/SEC
BH CV ECHO MEAS - MV A DUR: 0.08 SEC
BH CV ECHO MEAS - MV A MAX VEL: 87.8 CM/SEC
BH CV ECHO MEAS - MV DEC SLOPE: 292 CM/SEC^2
BH CV ECHO MEAS - MV DEC TIME: 185 SEC
BH CV ECHO MEAS - MV E MAX VEL: 72.3 CM/SEC
BH CV ECHO MEAS - MV E/A: 0.82
BH CV ECHO MEAS - MV MAX PG: 3 MMHG
BH CV ECHO MEAS - MV MEAN PG: 1 MMHG
BH CV ECHO MEAS - MV P1/2T MAX VEL: 69.5 CM/SEC
BH CV ECHO MEAS - MV P1/2T: 69.7 MSEC
BH CV ECHO MEAS - MV V2 MAX: 87.3 CM/SEC
BH CV ECHO MEAS - MV V2 MEAN: 53.2 CM/SEC
BH CV ECHO MEAS - MV V2 VTI: 15.1 CM
BH CV ECHO MEAS - MVA P1/2T LCG: 3.2 CM^2
BH CV ECHO MEAS - MVA(P1/2T): 3.2 CM^2
BH CV ECHO MEAS - MVA(VTI): 4.6 CM^2
BH CV ECHO MEAS - PA ACC TIME: 0.08 SEC
BH CV ECHO MEAS - PA MAX PG (FULL): 3.1 MMHG
BH CV ECHO MEAS - PA MAX PG: 5.5 MMHG
BH CV ECHO MEAS - PA PR(ACCEL): 41 MMHG
BH CV ECHO MEAS - PA V2 MAX: 117 CM/SEC
BH CV ECHO MEAS - PULM A REVS DUR: 0.09 SEC
BH CV ECHO MEAS - PULM A REVS VEL: 28.6 CM/SEC
BH CV ECHO MEAS - PULM DIAS VEL: 39.3 CM/SEC
BH CV ECHO MEAS - PULM S/D: 1.3
BH CV ECHO MEAS - PULM SYS VEL: 50.9 CM/SEC
BH CV ECHO MEAS - RAP SYSTOLE: 3 MMHG
BH CV ECHO MEAS - RV MAX PG: 2.4 MMHG
BH CV ECHO MEAS - RV MEAN PG: 1.5 MMHG
BH CV ECHO MEAS - RV V1 MAX: 75 CM/SEC
BH CV ECHO MEAS - RV V1 MEAN: 55.7 CM/SEC
BH CV ECHO MEAS - RV V1 VTI: 14.8 CM
BH CV ECHO MEAS - SI(AO): 81.1 ML/M^2
BH CV ECHO MEAS - SI(CUBED): 42.6 ML/M^2
BH CV ECHO MEAS - SI(LVOT): 43.8 ML/M^2
BH CV ECHO MEAS - SI(MOD-SP2): 22.1 ML/M^2
BH CV ECHO MEAS - SI(MOD-SP4): 30.9 ML/M^2
BH CV ECHO MEAS - SI(TEICH): 37.5 ML/M^2
BH CV ECHO MEAS - SV(AO): 128.5 ML
BH CV ECHO MEAS - SV(CUBED): 67.5 ML
BH CV ECHO MEAS - SV(LVOT): 69.4 ML
BH CV ECHO MEAS - SV(MOD-SP2): 35 ML
BH CV ECHO MEAS - SV(MOD-SP4): 49 ML
BH CV ECHO MEAS - SV(TEICH): 59.4 ML
BH CV ECHO MEAS - TAPSE (>1.6): 1.8 CM2
BH CV ECHO MEASUREMENTS AVERAGE E/E' RATIO: 7.99
BH CV VAS BP RIGHT ARM: NORMAL MMHG
BH CV XLRA - RV BASE: 3.1 CM
BH CV XLRA - TDI S': 12 CM/SEC
CHOLEST SERPL-MCNC: 220 MG/DL (ref 0–200)
GLUCOSE BLDC GLUCOMTR-MCNC: 96 MG/DL (ref 70–130)
HBA1C MFR BLD: 5.2 % (ref 4.8–5.6)
HDLC SERPL-MCNC: 63 MG/DL (ref 40–60)
LDLC SERPL CALC-MCNC: 137 MG/DL (ref 0–100)
LDLC/HDLC SERPL: 2.17 {RATIO}
LEFT ATRIUM VOLUME INDEX: 21 ML/M2
TRIGL SERPL-MCNC: 102 MG/DL (ref 0–150)
VLDLC SERPL-MCNC: 20.4 MG/DL (ref 5–40)

## 2019-03-08 PROCEDURE — 70450 CT HEAD/BRAIN W/O DYE: CPT

## 2019-03-08 PROCEDURE — 97110 THERAPEUTIC EXERCISES: CPT

## 2019-03-08 PROCEDURE — 93306 TTE W/DOPPLER COMPLETE: CPT

## 2019-03-08 PROCEDURE — 97161 PT EVAL LOW COMPLEX 20 MIN: CPT

## 2019-03-08 PROCEDURE — 80061 LIPID PANEL: CPT | Performed by: PSYCHIATRY & NEUROLOGY

## 2019-03-08 PROCEDURE — 99233 SBSQ HOSP IP/OBS HIGH 50: CPT | Performed by: PSYCHIATRY & NEUROLOGY

## 2019-03-08 PROCEDURE — 92610 EVALUATE SWALLOWING FUNCTION: CPT

## 2019-03-08 PROCEDURE — 82962 GLUCOSE BLOOD TEST: CPT

## 2019-03-08 PROCEDURE — 83036 HEMOGLOBIN GLYCOSYLATED A1C: CPT | Performed by: PSYCHIATRY & NEUROLOGY

## 2019-03-08 PROCEDURE — 93306 TTE W/DOPPLER COMPLETE: CPT | Performed by: INTERNAL MEDICINE

## 2019-03-08 RX ORDER — AMITRIPTYLINE HYDROCHLORIDE 25 MG/1
25 TABLET, FILM COATED ORAL NIGHTLY
Status: DISCONTINUED | OUTPATIENT
Start: 2019-03-08 | End: 2019-03-09 | Stop reason: HOSPADM

## 2019-03-08 RX ORDER — PANTOPRAZOLE SODIUM 40 MG/1
40 TABLET, DELAYED RELEASE ORAL EVERY MORNING
Status: DISCONTINUED | OUTPATIENT
Start: 2019-03-08 | End: 2019-03-09 | Stop reason: HOSPADM

## 2019-03-08 RX ORDER — CLOPIDOGREL BISULFATE 75 MG/1
75 TABLET ORAL DAILY
Status: DISCONTINUED | OUTPATIENT
Start: 2019-03-08 | End: 2019-03-09 | Stop reason: HOSPADM

## 2019-03-08 RX ORDER — ASPIRIN 81 MG/1
81 TABLET, CHEWABLE ORAL DAILY
Status: DISCONTINUED | OUTPATIENT
Start: 2019-03-08 | End: 2019-03-09 | Stop reason: HOSPADM

## 2019-03-08 RX ADMIN — SODIUM CHLORIDE, PRESERVATIVE FREE 3 ML: 5 INJECTION INTRAVENOUS at 08:38

## 2019-03-08 RX ADMIN — ASPIRIN 81 MG: 81 TABLET, CHEWABLE ORAL at 15:57

## 2019-03-08 RX ADMIN — CLOPIDOGREL 75 MG: 75 TABLET, FILM COATED ORAL at 15:50

## 2019-03-08 RX ADMIN — PANTOPRAZOLE SODIUM 40 MG: 40 TABLET, DELAYED RELEASE ORAL at 18:04

## 2019-03-08 RX ADMIN — AMITRIPTYLINE HYDROCHLORIDE 25 MG: 25 TABLET, FILM COATED ORAL at 20:35

## 2019-03-08 RX ADMIN — ATORVASTATIN CALCIUM 80 MG: 80 TABLET, FILM COATED ORAL at 20:35

## 2019-03-08 RX ADMIN — SODIUM CHLORIDE 75 ML/HR: 9 INJECTION, SOLUTION INTRAVENOUS at 00:01

## 2019-03-08 NOTE — PROGRESS NOTES
"  Daily Progress Note.   [facility] INTENSIVE CARE  3/8/2019    Patient:  Name:  Araceli Cantor  MRN:  [id number]  1955  63 y.o.  female         Interval History:  Doing well  Speech intact  No chest pain  Left arm strength reduced    ROS: No fever, no diarrhea, no chest pain  PMFSSH: no change    Physical Exam:  /84   Pulse 94   Temp 98.4 °F (36.9 °C) (Oral)   Resp 18   Ht 142.2 cm (56\")   Wt 69.5 kg (153 lb 3.5 oz)   SpO2 95%   BMI 34.35 kg/m²   Body mass index is 34.35 kg/m².    Intake/Output Summary (Last 24 hours) at 3/8/2019 1000  Last data filed at 3/8/2019 0500  Gross per 24 hour   Intake 3334.3 ml   Output 700 ml   Net 2634.3 ml     GENERAL:  NAD, Aox3  HEENT:  EOMI, nonicteric sclera, no JVD  PULMONARY:    CTAB, no wheeze, no crackles, no rhonchi, symmetric air entry  CARDIAC:  RRR no MRG, S1 S2  ABD: Mild truncal obesity soft nontender BS+  EXT: no c/c/e, pulses symmetric 2+ bilaterally  NEURO:  CNs II-XII intact, lue strenght 3/5  SKIN: no lesions, no rash  PSYCH: appropriate mood  LYMPH: no cervical LAD        Data Review:  Notable Labs:  Results from last 7 days   Lab Units 03/07/19  1121   WBC 10*3/mm3 7.04   HEMOGLOBIN g/dL 14.8   PLATELETS 10*3/mm3 228     Results from last 7 days   Lab Units 03/07/19  1120   SODIUM mmol/L 134*   POTASSIUM mmol/L 4.5   CHLORIDE mmol/L 99   CO2 mmol/L 23.3   BUN mg/dL 7*   CREATININE mg/dL 0.78   GLUCOSE mg/dL 120*   CALCIUM mg/dL 9.6   MAGNESIUM mg/dL 2.1   Estimated Creatinine Clearance: 64.7 mL/min (by C-G formula based on SCr of 0.78 mg/dL).    Imaging:  reviewed    Scheduled meds:      aspirin 81 mg Oral Daily   atorvastatin 80 mg Oral Nightly   clopidogrel 75 mg Oral Daily   sodium chloride 3 mL Intravenous Q12H   sodium chloride 3 mL Intravenous Q12H       ASSESSMENT  /  PLAN:  Hyponatremia -serial labs  Hypertension  GERD  Hyperglycemia  Acute stroke right subcortical     bp control  Begin asa 81 and plavix 75 after tpa 24 " hours  Statin  bp normalize  tte  ptot  To floor        Jeremi Caballero MD  [facility]  03/08/19  10:00 AM

## 2019-03-08 NOTE — THERAPY EVALUATION
Acute Care - Speech Language Pathology   Swallow Initial Evaluation [facility]     Patient Name: Araceli Cantor  : 1955  MRN: [id number]  Today's Date: 3/8/2019               Admit Date: 3/7/2019    Visit Dx:     ICD-10-CM ICD-9-CM   1. Cerebrovascular accident (CVA), unspecified mechanism (CMS/HCC) I63.9 434.91   2. Weakness of left lower extremity R29.898 729.89   3. Weakness of left upper extremity R29.898 729.89   4. Hypertension, unspecified type I10 401.9     Patient Active Problem List   Diagnosis   • Cerebrovascular accident (CVA) (CMS/HCC)     Past Medical History:   Diagnosis Date   • Back pain      Past Surgical History:   Procedure Laterality Date   • APPENDECTOMY     •  SECTION     • EYE SURGERY     • GALLBLADDER SURGERY          SWALLOW EVALUATION (last 72 hours)      SLP Adult Swallow Evaluation     Row Name 19 1400                   Rehab Evaluation    Document Type  evaluation  -AW        Subjective Information  no complaints  -AW        Patient Observations  alert;cooperative;agree to therapy  -AW        Patient/Family Observations  Pt upright in bed, no family present.  -AW        Patient Effort  good  -AW        Symptoms Noted During/After Treatment  none  -AW           General Information    Patient Profile Reviewed  yes  -AW        Pertinent History Of Current Problem  Pt with acute R subcortical stroke, received tPA. Pt has a h/o reflux and HTN. Chest xray negative.  -AW        Current Method of Nutrition  regular textures;thin liquids  -AW        Prior Level of Function-Communication  WFL  -AW        Prior Level of Function-Swallowing  no diet consistency restrictions  -AW        Plans/Goals Discussed with  patient;agreed upon  -AW        Barriers to Rehab  none identified  -AW        Patient's Goals for Discharge  return home  -AW           Pain Assessment    Additional Documentation  Pain Scale: Numbers Pre/Post-Treatment (Group)  -AW           Pain Scale: Numbers  Pre/Post-Treatment    Pain Scale: Numbers, Pretreatment  0/10 - no pain  -AW        Pain Scale: Numbers, Post-Treatment  0/10 - no pain  -AW           Oral Motor and Function    Dentition Assessment  natural, present and adequate  -AW        Secretion Management  WNL/WFL  -AW        Mucosal Quality  moist, healthy  -AW        Volitional Swallow  WFL  -AW        Volitional Cough  WFL  -AW           Oral Musculature and Cranial Nerve Assessment    Oral Motor General Assessment  WFL  -AW           General Eating/Swallowing Observations    Respiratory Support Currently in Use  room air  -AW        Eating/Swallowing Skills  self-fed;fed by SLP  -AW        Positioning During Eating  upright in bed  -AW        Utensils Used  spoon;cup;straw  -AW        Consistencies Trialed  regular textures;soft textures;pureed;thin liquids mixed  -AW        Pre SpO2 (%)  97  -AW        Post SpO2 (%)  95  -AW           Clinical Swallow Eval    Oral Prep Phase  WFL  -AW        Oral Transit  WFL  -AW        Oral Residue  WFL  -AW        Pharyngeal Phase  no overt signs/symptoms of pharyngeal impairment  -AW        Clinical Swallow Evaluation Summary  Pt tolerated thins via cup and straw. Mastication was functional for soft and regular solids. Laryngeal elevation and timing of swallow appeared WFL. Pt denied changes in speech or thinking.  -AW           Clinical Impression    SLP Swallowing Diagnosis  functional oral phase;functional pharyngeal phase  -AW        Functional Impact  no impact on function  -AW        Swallow Criteria for Skilled Therapeutic Interventions Met  no problems identified which require skilled intervention  -AW           Recommendations    Therapy Frequency (Swallow)  evaluation only  -AW        SLP Diet Recommendation  regular textures;thin liquids  -AW        Recommended Precautions and Strategies  upright posture during/after eating;small bites of food and sips of liquid  -AW        SLP Rec. for Method of  Medication Administration  meds whole;with thin liquids  -AW        Monitor for Signs of Aspiration  yes;notify SLP if any concerns  -AW        Anticipated Dischage Disposition  home with assist  -AW          User Key  (r) = Recorded By, (t) = Taken By, (c) = Cosigned By    Initials Name Effective Dates    Alyx Gomes MS CCC-SLP 06/08/18 -           EDUCATION  The patient has been educated in the following areas:   Dysphagia (Swallowing Impairment) Oral Care/Hydration.    SLP Recommendation and Plan  SLP Swallowing Diagnosis: functional oral phase, functional pharyngeal phase  SLP Diet Recommendation: regular textures, thin liquids  Recommended Precautions and Strategies: upright posture during/after eating, small bites of food and sips of liquid     Monitor for Signs of Aspiration: yes, notify SLP if any concerns     Swallow Criteria for Skilled Therapeutic Interventions Met: no problems identified which require skilled intervention  Anticipated Dischage Disposition: home with assist     Therapy Frequency (Swallow): evaluation only          Plan of Care Reviewed With: patient  Plan of Care Review  Plan of Care Reviewed With: patient  Progress: no change  Outcome Summary: Bedside Swallow eval completed. No s/s of aspiration. Recommend pt continue on regular diet. Pt reported no changes in thinking or speaking. ST is not indicated at this time. Please reconsult if needed.         SLP Outcome Measures (last 72 hours)      SLP Outcome Measures     Row Name 03/08/19 1100             SLP Outcome Measures    Outcome Measure Used?  Adult NOMS  -AW         Adult FCM Scores    FCM Chosen  Swallowing  -AW      Swallowing FCM Score  7  -AW        User Key  (r) = Recorded By, (t) = Taken By, (c) = Cosigned By    Initials Name Effective Alyx Tellez MS CCC-SLP 06/08/18 -            Time Calculation:   Time Calculation- SLP     Row Name 03/08/19 1414             Time Calculation- SLP    SLP Start Time  0900  -AW       SLP Received On  03/08/19  -[id number]        User Key  (r) = Recorded By, (t) = Taken By, (c) = Cosigned By    Initials Name Provider Type    Alyx Gomes, MS CCC-SLP Speech and Language Pathologist          Therapy Charges for Today     Code Description Service Date Service Provider Modifiers Qty    [id number]  ST EVAL ORAL PHARYNG SWALLOW 4 3/8/2019 Alyx Saldana, MS CCC-SLP GN 1               Alyx Saldana MS CCC-SLP  3/8/2019

## 2019-03-08 NOTE — PLAN OF CARE
Problem: Patient Care Overview  Goal: Plan of Care Review  Outcome: Ongoing (interventions implemented as appropriate)   03/08/19 0518   Coping/Psychosocial   Plan of Care Reviewed With patient   OTHER   Outcome Summary Pt presents to PT for evaluation following R subcortical stroke. Pt completed bed mobility and transfers w/ supervision, ambulated 300' w/CGA and demonstrated bilateral equal ROM and strength that is WFL. There are no impairments which require skilled PT intervention at this time. Pt and nsg made aware of plan of care, encouraged to contact PT again if problems or concerns arise. PT will sign off.

## 2019-03-08 NOTE — PLAN OF CARE
Problem: Fall Risk (Adult)  Goal: Identify Related Risk Factors and Signs and Symptoms  Outcome: Ongoing (interventions implemented as appropriate)    Goal: Absence of Fall  Outcome: Ongoing (interventions implemented as appropriate)      Problem: Patient Care Overview  Goal: Plan of Care Review  Outcome: Ongoing (interventions implemented as appropriate)   03/08/19 0556   Coping/Psychosocial   Plan of Care Reviewed With patient   Plan of Care Review   Progress improving   Coping/Psychosocial   Patient Agreement with Plan of Care agrees   OTHER   Outcome Summary Patient left sided weakness improving and has increased sensation. Pt alert and oriented x4. Continue to monitor for post TPA complications.       Problem: Stroke (Ischemic) (Adult)  Goal: Signs and Symptoms of Listed Potential Problems Will be Absent, Minimized or Managed (Stroke)  Outcome: Ongoing (interventions implemented as appropriate)      Problem: Patient Care Overview  Goal: Plan of Care Review  Outcome: Ongoing (interventions implemented as appropriate)

## 2019-03-08 NOTE — PLAN OF CARE
Problem: Patient Care Overview  Goal: Plan of Care Review  Outcome: Ongoing (interventions implemented as appropriate)   03/08/19 1153   Coping/Psychosocial   Plan of Care Reviewed With patient   Plan of Care Review   Progress no change   Coping/Psychosocial   Patient Agreement with Plan of Care agrees   OTHER   Outcome Summary Bedside Swallow eval completed. No s/s of aspiration. Recommend pt continue on regular diet. Pt reported no changes in thinking or speaking. ST is not indicated at this time. Please reconsult if needed.

## 2019-03-08 NOTE — PROGRESS NOTES
"DOS: 3/8/2019  NAME: Araceli Cantor   : 1955  PCP: System, Provider Not In  Chief Complaint   Patient presents with   • Extremity Weakness       Chief complaint: Stroke  Subjective: No acute events overnight, left-sided weakness and sensory loss improved after TPA.  MRI brain completed    Objective: (12)  Vital signs: /84   Pulse 94   Temp 98.4 °F (36.9 °C) (Oral)   Resp 18   Ht 142.2 cm (56\")   Wt 69.5 kg (153 lb 3.5 oz)   SpO2 95%   BMI 34.35 kg/m²    Gen: NAD, vitals reviewed  MS: oriented x3, recent/remote memory intact, normal attention/concentration, language intact, no neglect.  CN: visual acuity grossly normal, PERRL, EOMI, no facial droop, no dysarthria  Motor: 5/5 throughout upper and lower extremities, normal tone  Sensory: intact to light touch all 4 ext.    Review of Systems  No fevers, chills  No chest pain, palpitations    Laboratory results:  Lab Results   Component Value Date    GLUCOSE 120 (H) 2019    CALCIUM 9.6 2019     (L) 2019    K 4.5 2019    CO2 23.3 2019    CL 99 2019    BUN 7 (L) 2019    CREATININE 0.78 2019    EGFRIFNONA 75 2019    BCR 9.0 2019    ANIONGAP 11.7 2019     Lab Results   Component Value Date    WBC 7.04 2019    HGB 14.8 2019    HCT 44.1 2019    MCV 94.2 2019     2019     Lab Results   Component Value Date    CHOL 220 (H) 2019     Lab Results   Component Value Date    HDL 63 (H) 2019     Lab Results   Component Value Date     (H) 2019     Lab Results   Component Value Date    TRIG 102 2019     @[email or web address]@     Review of labs:     Review and interpretation of imaging: I personally reviewed her MRI brain which shows some areas of restricted diffusion in the right celestino that appear most likely artifactual.  Radiology report reviewed: Read as negative for acute stroke.    Workup to date:  CTA, CT perfusion: " Negative  MRI brain: No acute stroke  2D echo: Pending    Impression:  1.  Aborted stroke, right subcortical white matter, improved after TPA  2.  Status post TPA administration  3.  Essential hypertension  4.  Mixed hyperlipidemia    Comment: Working diagnosis right subcortical stroke aborted with TPA. MRI brain negative. Ok for out of ICU after 24H post TPA CT. OOB, PT/OT. Plan is DAPT and high dose statin.    Plan:  1. 24H post TPA CT  2. ASA 81 / plavix 75 after 24H post TPA time  3. Lipitor 80  4. Normalize BP  5. 2D echo  6. OOB PT/OT    Ok for out if ICU after post TPA head CT read as negative.

## 2019-03-08 NOTE — THERAPY DISCHARGE NOTE
Acute Care - Physical Therapy Initial Eval/Discharge  [facility]     Patient Name: Araceli Cantor  : 1955  MRN: [id number]  Today's Date: 3/8/2019   Onset of Illness/Injury or Date of Surgery: (P) 19            Admit Date: 3/7/2019    Visit Dx:    ICD-10-CM ICD-9-CM   1. Cerebrovascular accident (CVA), unspecified mechanism (CMS/HCC) I63.9 434.91   2. Weakness of left lower extremity R29.898 729.89   3. Weakness of left upper extremity R29.898 729.89   4. Hypertension, unspecified type I10 401.9     Patient Active Problem List   Diagnosis   • Cerebrovascular accident (CVA) (CMS/HCC)     Past Medical History:   Diagnosis Date   • Back pain      Past Surgical History:   Procedure Laterality Date   • APPENDECTOMY     •  SECTION     • EYE SURGERY     • GALLBLADDER SURGERY            PT ASSESSMENT (last 12 hours)      Physical Therapy Evaluation     Row Name 19 1444          PT Evaluation Time/Intention    Subjective Information  complains of;fatigue  (Pended)   -CG     Document Type  evaluation;discharge evaluation/summary  (Pended)   -CG     Mode of Treatment  physical therapy  (Pended)   -CG     Patient Effort  good  (Pended)   -CG     Symptoms Noted During/After Treatment  none  (Pended)   -CG     Row Name 19 1444          General Information    Patient Profile Reviewed?  yes  (Pended)   -CG     Onset of Illness/Injury or Date of Surgery  19  (Pended)   -CG     Patient Observations  alert;cooperative;agree to therapy  (Pended)   -CG     Patient/Family Observations  Pt supine in bed, no signs of acute distress at rest  (Pended)   -CG     Prior Level of Function  independent:;all household mobility;community mobility;gait;transfer  (Pended)   -CG     Equipment Currently Used at Home  none  (Pended)   -CG     Pertinent History of Current Functional Problem  Pt w/ R subcortical stroke  (Pended)   -CG     Existing Precautions/Restrictions  fall  (Pended)   -CG     Barriers to  Rehab  none identified  (Pended)   -[facility] Name 03/08/19 1444          Cognitive Assessment/Interventions    Additional Documentation  Cognitive Assessment/Intervention (Group)  (Pended)   -[facility] Name 03/08/19 1444          Cognitive Assessment/Intervention- PT/OT    Orientation Status (Cognition)  oriented x 3  (Pended)   -CG     Follows Commands (Cognition)  WNL  (Pended)   -     Personal Safety Interventions  gait belt;nonskid shoes/slippers when out of bed;fall prevention program maintained  (Pended)   -[facility] Name 03/08/19 1444          Bed Mobility Assessment/Treatment    Bed Mobility Assessment/Treatment  bed mobility (all) activities  (Pended)   -     [place] Level (Bed Mobility)  supervision  (Pended)   -[facility] Name 03/08/19 1444          Transfer Assessment/Treatment    Transfer Assessment/Treatment  sit-stand transfer;stand-sit transfer  (Pended)   -     Sit-Stand [place] (Transfers)  supervision  (Pended)   -     Stand-Sit [place] (Transfers)  supervision  (Pended)   -CG     Row Name 03/08/19 1444          Gait/Stairs Assessment/Training    [place] Level (Gait)  contact guard  (Pended)   -     Distance in Feet (Gait)  300  (Pended)   -CG     Pattern (Gait)  step-through  (Pended)   -     Bilateral Gait Deviations  heel strike decreased  (Pended)   -CG     Comment (Gait/Stairs)  Pt denies need for break throughout walk, asks when she can go walking again. 1 slight LOB noted when turning head L  (Pended)   -[facility] Name 03/08/19 1444          General ROM    GENERAL ROM COMMENTS  AROM grossly WFL for age  (Pended)   -[facility] Name 03/08/19 1444          MMT (Manual Muscle Testing)    General MMT Comments  equal bilaterally, grossly WNL for age  (Pended)   -[facility] Name 03/08/19 1444          Motor Assessment/Intervention    Additional Documentation  Balance (Group)  (Pended)   -[facility] Name 03/08/19 1444          Balance    Balance  static standing  balance;dynamic standing balance  (Pended)   -     Row Name 03/08/19 1444          Static Standing Balance    Level of [place] (Supported Standing, Static Balance)  supervision  (Pended)   -     Row Name 03/08/19 1444          Dynamic Standing Balance    Level of [place], Reaches Outside Midline (Standing, Dynamic Balance)  contact guard assist  (Pended)   -     Row Name 03/08/19 1444          Pain Scale: Numbers Pre/Post-Treatment    Pain Scale: Numbers, Pretreatment  0/10 - no pain  (Pended)   -     Pain Scale: Numbers, Post-Treatment  0/10 - no pain  (Pended)   -     Row Name 03/08/19 1444          Physical Therapy Clinical Impression    Criteria for Skilled Interventions Met (PT Clinical Impression)  no problems identified which require skilled intervention  (Pended)   -     Row Name 03/08/19 1444          Positioning and Restraints    Pre-Treatment Position  in bed  (Pended)   -     Post Treatment Position  bed  (Pended)   -CG     In Bed  supine;call light within reach;encouraged to call for assist;exit alarm on;notified nsg  (Pended)   -       User Key  (r) = Recorded By, (t) = Taken By, (c) = Cosigned By    Initials Name Provider Type    CG Rakesh Mendoza, PT Student PT Student          Physical Therapy Education     Title: PT OT SLP Therapies (In Progress)     Topic: Physical Therapy (In Progress)     Point: Mobility training (Done)     Learning Progress Summary           Patient Acceptance, E,TB, VU,DU by  at 3/8/2019  3:31 PM                   Point: Body mechanics (Done)     Learning Progress Summary           Patient Acceptance, E,TB, VU,DU by  at 3/8/2019  3:31 PM                   Point: Precautions (Done)     Learning Progress Summary           Patient Acceptance, E,TB, VU,DU by  at 3/8/2019  3:31 PM                               User Key     Initials Effective Dates Name Provider Type [facility] 02/04/19 -  Rakesh Mendoza, PT Student PT Student PT                 PT Recommendation and Plan  Anticipated Discharge Disposition (PT): (P) home, home with assist  Therapy Frequency (PT Clinical Impression): (P) evaluation only  Outcome Summary/Treatment Plan (PT)  Anticipated Discharge Disposition (PT): (P) home, home with assist  Plan of Care Reviewed With: (P) patient  Outcome Summary: (P) Pt presents to PT for evaluation following R subcortical stroke. Pt completed bed mobility w/ supervision, ambulated 300' w/CGA and demonstrated bilateral ROM and strength that is WFL. There are no impairments which require skilled PT intervention at this time. Pt and nsg made aware of plan of care, encouraged to contact PT again if problems or concerns arise. PT will sign off.    Outcome Measures     Row Name 03/08/19 1500             How much help from another person do you currently need...    Turning from your back to your side while in flat bed without using bedrails?  4  (Pended)   -CG      Moving from lying on back to sitting on the side of a flat bed without bedrails?  4  (Pended)   -CG      Moving to and from a bed to a chair (including a wheelchair)?  4  (Pended)   -CG      Standing up from a chair using your arms (e.g., wheelchair, bedside chair)?  4  (Pended)   -CG      Climbing 3-5 steps with a railing?  3  (Pended)   -CG      To walk in hospital room?  3  (Pended)   -CG      AM-PAC 6 Clicks Score  22  (Pended)   -CG         Modified [place] Scale    Modified [place] Scale  1 - No significant disability despite symptoms.  Able to carry out all usual duties and activities.  (Pended)   -CG         Functional Assessment    Outcome Measure Options  AM-PAC 6 Clicks Basic Mobility (PT);Modified [place]  (Pended)   -CG        User Key  (r) = Recorded By, (t) = Taken By, (c) = Cosigned By    Initials Name Provider Type    CG Rakesh Mendoza, PT Student PT Student           Time Calculation:   PT Charges     Row Name 03/08/19 1919             Time Calculation    Start Time  8748   (Pended)   -CG      Stop Time  1444  (Pended)   -CG      Time Calculation (min)  17 min  (Pended)   -CG      PT Received On  03/08/19  (Pended)   -CG         Time Calculation- PT    Total Timed Code Minutes- PT  10 minute(s)  (Pended)   -CG        User Key  (r) = Recorded By, (t) = Taken By, (c) = Cosigned By    Initials Name Provider Type    CG Rakesh Mendoza, PT Student PT Student        Therapy Suggested Charges     Code   Minutes Charges    None           Therapy Charges for Today     Code Description Service Date Service Provider Modifiers Qty    66875659300  PT EVAL LOW COMPLEXITY 1 3/8/2019 Rakesh Mendoza, PT Student GP 1    [id number]  PT THER PROC EA 15 MIN 3/8/2019 Rakesh Mendoza, PT Student GP 1          PT G-Codes  Outcome Measure Options: (P) AM-PAC 6 Clicks Basic Mobility (PT), Modified [place]  AM-PAC 6 Clicks Score: (P) 22  Modified [place] Scale: (P) 1 - No significant disability despite symptoms.  Able to carry out all usual duties and activities.    PT Discharge Summary  Anticipated Discharge Disposition (PT): (P) home, home with assist    Rakesh Mendoza PT Student  3/8/2019

## 2019-03-09 ENCOUNTER — DOCUMENTATION (OUTPATIENT)
Dept: NEUROLOGY | Facility: CLINIC | Age: 64
End: 2019-03-09

## 2019-03-09 VITALS
TEMPERATURE: 98.1 F | SYSTOLIC BLOOD PRESSURE: 137 MMHG | BODY MASS INDEX: 34.47 KG/M2 | HEART RATE: 88 BPM | DIASTOLIC BLOOD PRESSURE: 82 MMHG | WEIGHT: 153.22 LBS | OXYGEN SATURATION: 90 % | RESPIRATION RATE: 18 BRPM | HEIGHT: 56 IN

## 2019-03-09 LAB
ALBUMIN SERPL-MCNC: 3.6 G/DL (ref 3.5–5.2)
ANION GAP SERPL CALCULATED.3IONS-SCNC: 8.8 MMOL/L
BASOPHILS # BLD AUTO: 0.04 10*3/MM3 (ref 0–0.2)
BASOPHILS NFR BLD AUTO: 0.9 % (ref 0–1.5)
BUN BLD-MCNC: 6 MG/DL (ref 8–23)
BUN/CREAT SERPL: 9.1 (ref 7–25)
CALCIUM SPEC-SCNC: 9.1 MG/DL (ref 8.6–10.5)
CHLORIDE SERPL-SCNC: 110 MMOL/L (ref 98–107)
CO2 SERPL-SCNC: 23.2 MMOL/L (ref 22–29)
CREAT BLD-MCNC: 0.66 MG/DL (ref 0.57–1)
DEPRECATED RDW RBC AUTO: 47 FL (ref 37–54)
EOSINOPHIL # BLD AUTO: 0.09 10*3/MM3 (ref 0–0.4)
EOSINOPHIL NFR BLD AUTO: 2 % (ref 0.3–6.2)
ERYTHROCYTE [DISTWIDTH] IN BLOOD BY AUTOMATED COUNT: 13 % (ref 12.3–15.4)
GFR SERPL CREATININE-BSD FRML MDRD: 91 ML/MIN/1.73
GLUCOSE BLD-MCNC: 92 MG/DL (ref 65–99)
HCT VFR BLD AUTO: 42.5 % (ref 34–46.6)
HGB BLD-MCNC: 14 G/DL (ref 12–15.9)
IMM GRANULOCYTES # BLD AUTO: 0.01 10*3/MM3 (ref 0–0.05)
IMM GRANULOCYTES NFR BLD AUTO: 0.2 % (ref 0–0.5)
LYMPHOCYTES # BLD AUTO: 2 10*3/MM3 (ref 0.7–3.1)
LYMPHOCYTES NFR BLD AUTO: 45.4 % (ref 19.6–45.3)
MCH RBC QN AUTO: 32.3 PG (ref 26.6–33)
MCHC RBC AUTO-ENTMCNC: 32.9 G/DL (ref 31.5–35.7)
MCV RBC AUTO: 98.2 FL (ref 79–97)
MONOCYTES # BLD AUTO: 0.38 10*3/MM3 (ref 0.1–0.9)
MONOCYTES NFR BLD AUTO: 8.6 % (ref 5–12)
NEUTROPHILS # BLD AUTO: 1.89 10*3/MM3 (ref 1.4–7)
NEUTROPHILS NFR BLD AUTO: 42.9 % (ref 42.7–76)
NRBC BLD AUTO-RTO: 0 /100 WBC (ref 0–0)
PHOSPHATE SERPL-MCNC: 3.3 MG/DL (ref 2.5–4.5)
PLATELET # BLD AUTO: 211 10*3/MM3 (ref 140–450)
PMV BLD AUTO: 10.1 FL (ref 6–12)
POTASSIUM BLD-SCNC: 4.1 MMOL/L (ref 3.5–5.2)
RBC # BLD AUTO: 4.33 10*6/MM3 (ref 3.77–5.28)
SODIUM BLD-SCNC: 142 MMOL/L (ref 136–145)
WBC NRBC COR # BLD: 4.41 10*3/MM3 (ref 3.4–10.8)

## 2019-03-09 PROCEDURE — 80069 RENAL FUNCTION PANEL: CPT | Performed by: INTERNAL MEDICINE

## 2019-03-09 PROCEDURE — 97165 OT EVAL LOW COMPLEX 30 MIN: CPT

## 2019-03-09 PROCEDURE — 99233 SBSQ HOSP IP/OBS HIGH 50: CPT | Performed by: NURSE PRACTITIONER

## 2019-03-09 PROCEDURE — 85025 COMPLETE CBC W/AUTO DIFF WBC: CPT | Performed by: INTERNAL MEDICINE

## 2019-03-09 RX ORDER — CLOPIDOGREL BISULFATE 75 MG/1
75 TABLET ORAL DAILY
Qty: 30 TABLET | Refills: 0 | Status: SHIPPED | OUTPATIENT
Start: 2019-03-10

## 2019-03-09 RX ORDER — ASPIRIN 81 MG/1
81 TABLET, CHEWABLE ORAL DAILY
Qty: 30 TABLET | Refills: 0 | Status: SHIPPED | OUTPATIENT
Start: 2019-03-10

## 2019-03-09 RX ORDER — ATORVASTATIN CALCIUM 80 MG/1
80 TABLET, FILM COATED ORAL NIGHTLY
Qty: 30 TABLET | Refills: 0 | Status: SHIPPED | OUTPATIENT
Start: 2019-03-09

## 2019-03-09 RX ADMIN — CLOPIDOGREL 75 MG: 75 TABLET, FILM COATED ORAL at 07:49

## 2019-03-09 RX ADMIN — ASPIRIN 81 MG: 81 TABLET, CHEWABLE ORAL at 07:51

## 2019-03-09 RX ADMIN — PANTOPRAZOLE SODIUM 40 MG: 40 TABLET, DELAYED RELEASE ORAL at 06:44

## 2019-03-09 RX ADMIN — Medication 3 ML: at 10:06

## 2019-03-09 NOTE — PLAN OF CARE
Problem: Patient Care Overview  Goal: Plan of Care Review   03/09/19 0241   Coping/Psychosocial   Plan of Care Reviewed With patient   Plan of Care Review   Progress improving   Coping/Psychosocial   Patient Agreement with Plan of Care agrees   OTHER   Outcome Summary Neuro checks remain stable. Sensory on the LLE is a little bit decreased. Ambulates w/ SBA. SBP remains less than 180.        Problem: Stroke (Ischemic) (Adult)  Goal: Signs and Symptoms of Listed Potential Problems Will be Absent, Minimized or Managed (Stroke)  Outcome: Ongoing (interventions implemented as appropriate)

## 2019-03-09 NOTE — PLAN OF CARE
Problem: Patient Care Overview  Goal: Plan of Care Review   03/09/19 1101   Coping/Psychosocial   Plan of Care Reviewed With patient   Coping/Psychosocial   Patient Agreement with Plan of Care agrees   OTHER   Outcome Summary OT. No pain c/o. Pt. was able to complete LE dressing pants and socks with set up/S. Ambulated to BR for oral hygiene standing at sink with supervision. Reviewed safety and home for ADL's and functional transfers. No UE AROM, FM deficits noted. Will sign off from OT at this time. Pt wants to return home.

## 2019-03-09 NOTE — PROGRESS NOTES
DOS: 3/9/2019  NAME: Araceli Cantor   : 1955  PCP: System, Provider Not In    Chief Complaint   Patient presents with   • Extremity Weakness        Stroke    Subjective: Pt seen in follow up, however the problem is new to me. No new acute events overnight. Denies any new acute neurological symptoms. Appears anxious, ready to go home.    Objective:  Vital signs:      Vitals:    19 1705 19 0610 19 1126   BP: 155/92 144/85 143/96 137/82   BP Location: Left arm Right arm Right arm Right arm   Patient Position:  Lying Sitting Lying   Pulse: 95 84 93 88   Resp: 18 18 18 18   Temp: 98.2 °F (36.8 °C) 98 °F (36.7 °C) 98.2 °F (36.8 °C) 98.1 °F (36.7 °C)   TempSrc: Oral Oral Oral Oral   SpO2: 95% 96% 95% 90%   Weight:       Height:           Current Facility-Administered Medications:   •  acetaminophen (TYLENOL) tablet 650 mg, 650 mg, Oral, Q4H PRN, Jeremi Caballero MD  •  amitriptyline (ELAVIL) tablet 25 mg, 25 mg, Oral, Nightly, Loni Ac MD, 25 mg at 19  •  aspirin chewable tablet 81 mg, 81 mg, Oral, Daily, Zackary Bassett MD, 81 mg at 19 0751  •  atorvastatin (LIPITOR) tablet 80 mg, 80 mg, Oral, Nightly, Zackary Bassett MD, 80 mg at 19  •  clopidogrel (PLAVIX) tablet 75 mg, 75 mg, Oral, Daily, Zackary Bassett MD, 75 mg at 19 0749  •  ondansetron (ZOFRAN) injection 4 mg, 4 mg, Intravenous, Q6H PRN, Jeremi Caballero MD, 4 mg at 19 1653  •  pantoprazole (PROTONIX) EC tablet 40 mg, 40 mg, Oral, QAM, Loni Ac MD, 40 mg at 19 0644  •  sodium chloride 0.9 % flush 10 mL, 10 mL, Intravenous, PRN, Devang Carranza MD  •  sodium chloride 0.9 % flush 3 mL, 3 mL, Intravenous, Q12H, Zackary Bassett MD, 3 mL at 19 0838  •  sodium chloride 0.9 % flush 3 mL, 3 mL, Intravenous, Q12H, Jeremi Caballero MD, 3 mL at 19 1006  •  sodium chloride 0.9 % flush 3-10 mL, 3-10 mL,  Intravenous, PRN, Zackary Bassett MD  •  sodium chloride 0.9 % flush 3-10 mL, 3-10 mL, Intravenous, PRN, Jeremi Caballero MD    Current Outpatient Medications:   •  amitriptyline (ELAVIL) 25 MG tablet, Take 25 mg by mouth Every Night., Disp: , Rfl:   •  omeprazole (priLOSEC) 20 MG capsule, Take 40 mg by mouth Daily., Disp: , Rfl:   •  [START ON 3/10/2019] aspirin 81 MG chewable tablet, Chew 1 tablet Daily., Disp: 30 tablet, Rfl: 0  •  atorvastatin (LIPITOR) 80 MG tablet, Take 1 tablet by mouth Every Night., Disp: 30 tablet, Rfl: 0  •  [START ON 3/10/2019] clopidogrel (PLAVIX) 75 MG tablet, Take 1 tablet by mouth Daily., Disp: 30 tablet, Rfl: 0    PRN meds  •  acetaminophen  •  ondansetron  •  sodium chloride  •  sodium chloride  •  sodium chloride    No current facility-administered medications on file prior to encounter.      Current Outpatient Medications on File Prior to Encounter   Medication Sig   • amitriptyline (ELAVIL) 25 MG tablet Take 25 mg by mouth Every Night.   • omeprazole (priLOSEC) 20 MG capsule Take 40 mg by mouth Daily.       General appearance: NAD, alert and cooperative, well groomed  HEENT: Normocephalic, atraumatic, PERRL, no masses or tenderness  COR: RRR  Resp: Even and unlabored  Extremities: Equal pulses  Skin: warm, dry    Neurological:   MS: oriented x3, recent/remote memory intact, normal attention/concentration, language intact, no neglect, normal fund of knowledge  CN: visual acuity grossly normal, visual fields full, PERRL, EOMI, facial sensation equal, no facial droop, hearing symmetric, palate elevates symmetrically, shoulder shrug equal, tongue midline  Motor: 5/5 in all 4 ext., normal tone  Sensory: light touch sensation intact in all 4 ext.  Coordination: Normal finger to nose test, normal heel to shin test    Laboratory results:  Lab Results   Component Value Date    TSH 1.230 03/07/2019     Lab Results   Component Value Date    HGBA1C 5.20 03/08/2019     No  results found for: [id number]  Lab Results   Component Value Date    CHOL 220 (H) 03/08/2019     Lab Results   Component Value Date    TRIG 102 03/08/2019     Lab Results   Component Value Date    HDL 63 (H) 03/08/2019     Lab Results   Component Value Date     (H) 03/08/2019     Lab Results   Component Value Date    WBC 4.41 03/09/2019    HGB 14.0 03/09/2019    HCT 42.5 03/09/2019    MCV 98.2 (H) 03/09/2019     03/09/2019     Lab Results   Component Value Date    GLUCOSE 92 03/09/2019    BUN 6 (L) 03/09/2019    CREATININE 0.66 03/09/2019    EGFRIFNONA 91 03/09/2019    BCR 9.1 03/09/2019    K 4.1 03/09/2019    CO2 23.2 03/09/2019    CALCIUM 9.1 03/09/2019    ALBUMIN 3.60 03/09/2019    AST 27 03/07/2019    ALT 32 03/07/2019     Brief Urine Lab Results  (Last result in the past 365 days)      Color   Clarity   Blood   Leuk Est   Nitrite   Protein   CREAT   Urine HCG        03/07/19 1956 Yellow Clear Negative Negative Negative Negative               Review and interpretation of imaging:  Ct Angiogram Head With & Without Contrast    Result Date: 3/8/2019   There is no evidence for an acute completed infarct on either the noncontrast head CT or the CT perfusion study.  There is no evidence for a large vessel occlusion on the CT angiogram of the head.  There is no NASCET stenosis within either common carotid artery bifurcation or proximal internal carotid.  Mild stenosis is seen at the origin of the left vertebral artery.  Incidental note is made of an indeterminate hypodense nodule within the left lobe of the thyroid measuring up to 1.2 cm in diameter. Further evaluation is recommended with a thyroid ultrasound along with potential tissue sampling.  The findings of the noncontrast head CT were discussed with Dr. Bassett on 03/07/2019 at approximately 11:29 AM. The findings of the CT angiogram and CT perfusion study were discussed with Dr. Bassett on 03/07/2019 at approximately 11:55 AM. The findings and  recommendations with regards to the thyroid nodule were discussed with Dr. Carranza.  Radiation dose reduction techniques were utilized, including automated exposure control and exposure modulation based on body size.  This report was finalized on 3/8/2019 8:01 AM by Dr. Juan Cunningham M.D.      Ct Head Without Contrast    Result Date: 3/8/2019   There is no evidence for an acute infarct on head CT and there is no evidence for hemorrhagic transformation.  Radiation dose reduction techniques were utilized, including automated exposure control and exposure modulation based on body size.       Ct Angiogram Neck With & Without Contrast    Result Date: 3/8/2019   There is no evidence for an acute completed infarct on either the noncontrast head CT or the CT perfusion study.  There is no evidence for a large vessel occlusion on the CT angiogram of the head.  There is no NASCET stenosis within either common carotid artery bifurcation or proximal internal carotid.  Mild stenosis is seen at the origin of the left vertebral artery.  Incidental note is made of an indeterminate hypodense nodule within the left lobe of the thyroid measuring up to 1.2 cm in diameter. Further evaluation is recommended with a thyroid ultrasound along with potential tissue sampling.  The findings of the noncontrast head CT were discussed with Dr. Bassett on 03/07/2019 at approximately 11:29 AM. The findings of the CT angiogram and CT perfusion study were discussed with Dr. Bassett on 03/07/2019 at approximately 11:55 AM. The findings and recommendations with regards to the thyroid nodule were discussed with Dr. Carranza.  Radiation dose reduction techniques were utilized, including automated exposure control and exposure modulation based on body size.  This report was finalized on 3/8/2019 8:01 AM by Dr. Juan Cunningham M.D.      Mri Brain Without Contrast    Result Date: 3/8/2019  Minimal chronic-appearing white matter changes, no acute intracranial finding   This report was finalized on 3/8/2019 4:19 AM by Oswald Naranjo M.D.      Ct Cerebral Perfusion With & Without Contrast    Result Date: 3/8/2019   There is no evidence for an acute completed infarct on either the noncontrast head CT or the CT perfusion study.  There is no evidence for a large vessel occlusion on the CT angiogram of the head.  There is no NASCET stenosis within either common carotid artery bifurcation or proximal internal carotid.  Mild stenosis is seen at the origin of the left vertebral artery.  Incidental note is made of an indeterminate hypodense nodule within the left lobe of the thyroid measuring up to 1.2 cm in diameter. Further evaluation is recommended with a thyroid ultrasound along with potential tissue sampling.  The findings of the noncontrast head CT were discussed with Dr. Bassett on 03/07/2019 at approximately 11:29 AM. The findings of the CT angiogram and CT perfusion study were discussed with Dr. Bassett on 03/07/2019 at approximately 11:55 AM. The findings and recommendations with regards to the thyroid nodule were discussed with Dr. Carranza.  Radiation dose reduction techniques were utilized, including automated exposure control and exposure modulation based on body size.  This report was finalized on 3/8/2019 8:01 AM by Dr. Juan Cunningham M.D.      Results for orders placed during the hospital encounter of 03/07/19   Adult Transthoracic Echo Complete W/ Cont if Necessary Per Protocol (With Agitated Saline)    Narrative · Left ventricular systolic function is normal. Calculated EF = 58.0%.   Estimated EF was in agreement with the calculated EF. Normal left   ventricular cavity size and wall thickness noted. All left ventricular   wall segments contract normally. Left ventricular diastolic dysfunction is   noted (grade I) consistent with impaired relaxation.  · Normal left atrial size noted. Saline test results are negative.          Impression/Assessment:  62 yo female with PMH of  HTN, GERD, anxiety, who presented to the hospital with c/o sudden onset of left arm and leg weakness. She was also noted to be hypertensive /110. Team D was initiated. Clinically she had an acute right subcortical stroke and the decision was made to give TPA. MRI showed no acute infarct. CTA H/N and CTP negative for acute stroke or significant stenosis. Of note, there was an incidental left lobe thyroid nodule. 2D echo with normal LA size, saline test negative, EF 58%. Repeat  CT head with no hemorrhagic conversion s/p TPA.     1.  Aborted stroke, right subcortical white matter, improved after TPA  2.  Essential hypertension  3.  Mixed hyperlipidemia    Continue DAPT ASA/Plavix x 1 month then ASA alone. High dose statin, with repeat lipid panel in 3 months. Follow up with ENT outpatient for thyroid nodule noted on CTA H/N.  Therapies as written. CCP for discharge planning. Call RRT for any acute neurological changes. Okay to discharge from our standpoint. Follow up with me in 3 months. Will sign off, will see again per request.    Plan:  ASA 81mg and Plavix 75mg x 1 month and then ASA monotherapy.  Lipitor 80mg, , recommend repeat lipid panel in 3 months.  Follow up with ENT outpatient for thyroid nodule noted on CTA H/N.  Neurochecks per stroke protocol  Normalize BP, follow up with PCP   Stroke Education  TERESA/SCDs  PT/OT/ST  Okay to d/c home from our standpoint.  Follow up with me in 3 months for stroke/TPA follow up.    Case discussed with , and he agrees with plan above.   FANTA Cain

## 2019-03-09 NOTE — THERAPY EVALUATION
Acute Care - Occupational Therapy Initial Evaluation/Discharge  [facility]     Patient Name: Araceli Cantor  : 1955  MRN: [id number]  Today's Date: 3/9/2019  Onset of Illness/Injury or Date of Surgery: 19          Admit Date: 3/7/2019       ICD-10-CM ICD-9-CM   1. Cerebrovascular accident (CVA), unspecified mechanism (CMS/HCC) I63.9 434.91   2. Weakness of left lower extremity R29.898 729.89   3. Weakness of left upper extremity R29.898 729.89   4. Hypertension, unspecified type I10 401.9     Patient Active Problem List   Diagnosis   • Cerebrovascular accident (CVA) (CMS/[facility])     Past Medical History:   Diagnosis Date   • Back pain      Past Surgical History:   Procedure Laterality Date   • APPENDECTOMY     •  SECTION     • EYE SURGERY     • GALLBLADDER SURGERY            OT ASSESSMENT FLOWSHEET (last 72 hours)      Occupational Therapy Evaluation     Row Name 19 1000                   OT Evaluation Time/Intention    Subjective Information  no complaints  -CW        Document Type  evaluation  -CW        Mode of Treatment  occupational therapy  -CW        Patient Effort  excellent  -CW        Symptoms Noted During/After Treatment  none  -CW           General Information    Patient Profile Reviewed?  yes  -CW        Onset of Illness/Injury or Date of Surgery  19  -CW        Patient Observations  alert;cooperative;agree to therapy  -CW        Patient/Family Observations  Pt. resting supine bed level  -CW        Prior Level of Function  independent:  -CW        Equipment Currently Used at Home  none  -CW        Existing Precautions/Restrictions  fall  -CW           Cognitive Assessment/Intervention- PT/OT    Orientation Status (Cognition)  oriented to;person;place;situation  -CW        Follows Commands (Cognition)  WNL  -CW        Personal Safety Interventions  fall prevention program maintained;gait belt;nonskid shoes/slippers when out of bed  -CW           Functional Mobility     Functional Mobility- Comment  Ambulated to BR with supervision  -CW           Sit-Stand Transfer    Sit-Stand [place] (Transfers)  supervision  -CW           Stand-Sit Transfer    Stand-Sit [place] (Transfers)  supervision  -CW           ADL Assessment/Intervention    BADL Assessment/Intervention  lower body dressing;grooming  -CW           Lower Body Dressing Assessment/Training    Lower Body Dressing [place] Level  lower body dressing skills;doff;don;pants/bottoms;socks;set up;supervision  -CW        Lower Body Dressing Position  edge of bed sitting;unsupported standing  -CW           Grooming Assessment/Training    [place] Level (Grooming)  grooming skills;oral care regimen;set up;supervision  -CW        Grooming Position  sink side;supported standing  -CW           General ROM    GENERAL ROM COMMENTS  AROM UE's WFL's  -CW           MMT (Manual Muscle Testing)    General MMT Comments  WFL's BUE's  -CW           Motor Assessment/Interventions    Additional Documentation  Fine Motor Testing & Training (Group)  -CW           Static Standing Balance    Level of [place] (Supported Standing, Static Balance)  supervision  -CW           Dynamic Standing Balance    Level of [place], Reaches Outside Midline (Standing, Dynamic Balance)  standby assist  -CW           Fine Motor Testing & Training    Comment, Fine Motor Coordination  No difficulty per pt.   -CW           Positioning and Restraints    Pre-Treatment Position  in bed  -CW        Post Treatment Position  bed  -CW        In Bed  sitting EOB;call light within reach;encouraged to call for assist;with nsg  -CW           Pain Scale: Numbers Pre/Post-Treatment    Pain Scale: Numbers, Pretreatment  0/10 - no pain  -CW        Pain Scale: Numbers, Post-Treatment  0/10 - no pain  -CW           Clinical Impression (OT)    Functional Level at Time of Evaluation (OT Eval)  supervision  -CW        Criteria for Skilled Therapeutic Interventions Met  (OT Eval)  no problems identified which require skilled intervention  -CW        Therapy Frequency (OT Eval)  evaluation only  -CW        Anticipated Discharge Disposition (OT)  home with assist intermittent assist as needed  -CW           Patient Education Goal (OT)    Activity (Patient Education Goal, OT)  Ed. completed for safety at home to complete ADL's and functional transfers.   -CW        [place]/Cues/Accuracy (Memory Goal 2, OT)  demonstrates adequately  -CW        Time Frame (Patient Education Goal, OT)  by discharge  -CW        Progress/Outcome (Patient Education Goal, OT)  goal met  -CW           Discharge Summary (Occupational Therapy)    Additional Documentation  Discharge Summary, OT Eval (Group)  -CW           Discharge Summary, OT Eval    Reason for Discharge (OT Discharge Summary)  no further needs identified  -CW        Outcomes Achieved Upon Discharge (OT Discharge Summary)  other (see comments) eval only  -CW          User Key  (r) = Recorded By, (t) = Taken By, (c) = Cosigned By    Initials Name Effective Dates    CW Sneha Hood OTR 06/08/18 -          Occupational Therapy Education     Title: PT OT SLP Therapies (In Progress)     Topic: Occupational Therapy (Done)     Point: ADL training (Done)     Description: Instruct learner(s) on proper safety adaptation and remediation techniques during self care or transfers.   Instruct in proper use of assistive devices.    Learning Progress Summary           Patient REINIER Huddleston, VU by KATHY at 3/9/2019 11:00 AM    Comment:  Ed completed for safety during ADL's and functional transfers                               User Key     Initials Effective Dates Name Provider Type Discipline     06/08/18 -  Sneha Hood OTR Occupational Therapist OT                  OT Recommendation and Plan  Outcome Summary/Treatment Plan (OT)  Anticipated Discharge Disposition (OT): home with assist(intermittent assist as needed)  Reason for Discharge (OT Discharge  Summary): no further needs identified  Therapy Frequency (OT Eval): evaluation only  Plan of Care Review  Plan of Care Reviewed With: patient  Plan of Care Reviewed With: patient  Outcome Summary: OT. No pain c/o. Pt. was able to complete LE dressing pants and socks     Outcome Measures     Row Name 03/09/19 1100 03/08/19 1500          How much help from another person do you currently need...    Turning from your back to your side while in flat bed without using bedrails?  --  4  -CH (r) CG (t) CH (c)     Moving from lying on back to sitting on the side of a flat bed without bedrails?  --  4  -CH (r) CG (t) CH (c)     Moving to and from a bed to a chair (including a wheelchair)?  --  4  -CH (r) CG (t) CH (c)     Standing up from a chair using your arms (e.g., wheelchair, bedside chair)?  --  4  -CH (r) CG (t) CH (c)     Climbing 3-5 steps with a railing?  --  3  -CH (r) CG (t) CH (c)     To walk in hospital room?  --  3  -CH (r) CG (t) CH (c)     AM-PAC 6 Clicks Score  --  22  -CH (r) CG (t)        How much help from another is currently needed...    Putting on and taking off regular lower body clothing?  4  -CW  --     Bathing (including washing, rinsing, and drying)  4  -CW  --     Toileting (which includes using toilet bed pan or urinal)  4  -CW  --     Putting on and taking off regular upper body clothing  4  -CW  --     Taking care of personal grooming (such as brushing teeth)  4  -CW  --     Eating meals  4  -CW  --     Score  24  -CW  --        Modified Sharkey Scale    Modified Sharkey Scale  1 - No significant disability despite symptoms.  Able to carry out all usual duties and activities.  -CW  1 - No significant disability despite symptoms.  Able to carry out all usual duties and activities.  -CH (r) CG (t) CH (c)        Functional Assessment    Outcome Measure Options  AM-PAC 6 Clicks Daily Activity (OT)  -CW  AM-PAC 6 Clicks Basic Mobility (PT);Modified Sharkey  -CH (r) CG (t) CH (c)       User Key  (r) =  Recorded By, (t) = Taken By, (c) = Cosigned By    Initials Name Provider Type    CW Sneha Hood OTR Occupational Therapist    CH Breana Sauer, PT Physical Therapist    CG Rakesh Mendoza, MARIEL Student PT Student          Time Calculation:   Time Calculation- OT     Row Name 03/09/19 1112             Time Calculation- OT    OT Start Time  1025  -CW      OT Stop Time  1044  -CW      OT Time Calculation (min)  19 min  -CW        User Key  (r) = Recorded By, (t) = Taken By, (c) = Cosigned By    Initials Name Provider Type    Sneha Shin OTR Occupational Therapist        Therapy Suggested Charges     Code   Minutes Charges    None           Therapy Charges for Today     Code Description Service Date Service Provider Modifiers Qty    28250432391 HC OT EVAL LOW COMPLEXITY 2 3/9/2019 Sneha Hood OTR GO 1               MYNOR Julien  3/9/2019

## 2019-03-09 NOTE — DISCHARGE SUMMARY
PHYSICIAN DISCHARGE SUMMARY                                                                          [facility]    Patient Identification:  Name: Araceli Cantor  Age: 63 y.o.  Sex: female  :  1955  MRN: [id number]  Primary Care Physician: System, Provider Not In    Admit date: 3/7/2019  Discharge date and time:3/9/2019  Discharged Condition: fair    Discharge Diagnoses:  Cerebrovascular accident (CVA) (CMS/HCC)       Hospital Course: Araceli Cantor presented to [facility] with a right subcortical stroke aborted with TPA.  MRI was negative.  Patient was evaluated by stroke service.  They recommended aspirin 81 as well as Plavix 75 mg after patient received the TPA.  We will also start Lipitor 80 mg we have been okay to normalize her blood pressure.  Blood pressure is at goal day of discharge.  Patient has been evaluated by PT OT and speech.  They cleared her for no physical therapy needs for inpatient rehab.  On day of discharge patient is alert awake she is extensively counseled regarding tobacco cessation.  She understands the importance and she will think about quitting.  I have told her that she will increase her chances of stroke heart attacks lung cancers respiratory failure heart attacks and aneurysms if she continues to smoke.  I requested that she brought a picture of her grandchildren and put them where she keeps her cigarettes.  She will strongly consider quitting smoking.    Consults:   IP CONSULT TO NEUROLOGY  IP CONSULT TO NEUROLOGY  IP CONSULT TO PULMONOLOGY  IP CONSULT TO NEURO CLINICAL SPECIALIST  IP CONSULT TO REHAB ADMISSION  IP CONSULT TO CASE MANAGEMENT     Significant Diagnostic Studies:   Results from last 7 days   Lab Units 19  0628 19  1121   WBC 10*3/mm3 4.41 7.04   HEMOGLOBIN g/dL 14.0 14.8   PLATELETS 10*3/mm3 211 228     Results from last 7 days   Lab Units  03/09/19  0628 03/07/19  1120   SODIUM mmol/L 142 134*   POTASSIUM mmol/L 4.1 4.5   CHLORIDE mmol/L 110* 99   CO2 mmol/L 23.2 23.3   BUN mg/dL 6* 7*   CREATININE mg/dL 0.66 0.78   GLUCOSE mg/dL 92 120*   CALCIUM mg/dL 9.1 9.6   MAGNESIUM mg/dL  --  2.1   PHOSPHORUS mg/dL 3.3  --    Estimated Creatinine Clearance: 76.4 mL/min (by C-G formula based on SCr of 0.66 mg/dL).        Discharge Exam:  Temp:  [98 °F (36.7 °C)-98.5 °F (36.9 °C)] 98.1 °F (36.7 °C)  Heart Rate:  [84-95] 88  Resp:  [18] 18  BP: (137-155)/(82-96) 137/82  GENERAL:  NAD, Aox3  HEENT:  EOMI, nonicteric sclera, no JVD  PULMONARY:    CTAB, no wheeze, no crackles, no rhonchi, symmetric air entry  CARDIAC:  RRR no MRG, S1 S2  ABD: SNTND BS+  EXT: no c/c/e, pulses symmetric 2+ bilaterally  NEURO:  CNs II-XII intact, no focal deficits     Disposition:  Home    Patient Instructions: see emar  Follow-up Information     Rhina Dur[facility] APRN. Schedule an appointment as soon as possible for a visit in 3 month(s).    Specialty:  Neurology  Contact information:  [address] [place] [address] [phone]                    Medication Reconciliation: Please see electronically completed Med Rec.    Total time spent discharging patient including evaluation, medication reconciliation, arranging follow up, and post hospitalization instructions and education total time exceeds 30 minutes.    Signed:  Jeremi Caballero MD  3/9/2019  1:50 PM

## 2019-03-10 ENCOUNTER — READMISSION MANAGEMENT (OUTPATIENT)
Dept: CALL CENTER | Facility: HOSPITAL | Age: 64
End: 2019-03-10

## 2019-03-10 NOTE — OUTREACH NOTE
Prep Survey      Responses   Facility patient discharged from?  [facility]   Is patient eligible?  Yes   Discharge diagnosis  CVA s/p TPA   Does the patient have one of the following disease processes/diagnoses(primary or secondary)?  Stroke (TIA)   Does the patient have Home health ordered?  No   Is there a DME ordered?  No   Comments regarding appointments  Pt. to schedule follow up appointments.   Prep survey completed?  Yes          Tiffany Tovar RN

## 2019-03-11 ENCOUNTER — READMISSION MANAGEMENT (OUTPATIENT)
Dept: CALL CENTER | Facility: HOSPITAL | Age: 64
End: 2019-03-11

## 2019-03-11 NOTE — PROGRESS NOTES
Continued Stay Note  [facility]     Patient Name: Araceli Cantor  MRN: [id number]  Today's Date: 3/11/2019    Admit Date: 3/7/2019    Discharge Plan     Row Name 03/11/19 1001       Plan    Final Discharge Disposition Code  01 - home or self-care    Final Note  Home with family assist        Discharge Codes    No documentation.       Expected Discharge Date and Time     Expected Discharge Date Expected Discharge Time    Mar 9, 2019             Amy Gavin RN

## 2019-03-11 NOTE — OUTREACH NOTE
Stroke Week 1 Survey      Responses   Facility patient discharged from?  [place]   Does the patient have one of the following disease processes/diagnoses(primary or secondary)?  Stroke (TIA)   Is there a successful TCM telephone encounter documented?  No   Week 1 attempt successful?  Yes   Call start time  1654   Call end time  1700   Is patient permission given to speak with other caregiver?  No   Meds reviewed with patient/caregiver?  Yes   Is the patient having any side effects they believe may be caused by any medication additions or changes?  No   Does the patient have all medications ordered at discharge?  Yes   Is the patient taking all medications as directed (includes completed medication regime)?  Yes   Comments regarding appointments  saw her doctor today because she had a dizzy spell   Does the patient have a primary care provider?   Yes   Does the patient have an appointment with their PCP within 7 days of discharge?  Yes   Has the patient kept scheduled appointments due by today?  Yes   Has home health visited the patient within 72 hours of discharge?  N/A   Does the patient have any residual symptoms from stroke/TIA?  No   Does the patient understand the diet ordered at discharge?  Yes   Did the patient receive a copy of their discharge instructions?  Yes   What is the patient's perception of their health status since discharge?  New symptoms unrelated to diagnosis [had a dizzy spell this  morning and saw her provider]   Is the patient/caregiver able to teach back the risk factors for a stroke?  High Cholesterol, Smoking [bp was good this morning   at the doctor's office, has quit smoking]   Is the patient/caregiver able to teach back signs and symptoms related to disease process for when to call PCP?  Yes   Is the patient/caregiver able to teach back signs and symptoms related to disease process for when to call 911?  Yes   Is the patient/caregiver able to teach back the hierarchy of who to  call/visit for symptoms/problems? PCP, Specialist, Home health nurse, Urgent Care, ED, 911  Yes   Week 1 call completed?  Yes   Wrap up additional comments  went to see her provider          Abi You RN

## 2019-03-12 LAB — CREAT BLDA-MCNC: 0.7 MG/DL (ref 0.6–1.3)

## 2019-03-14 LAB — GLUCOSE BLDC GLUCOMTR-MCNC: 116 MG/DL (ref 70–130)

## 2019-03-28 ENCOUNTER — TELEPHONE (OUTPATIENT)
Dept: NEUROLOGY | Facility: CLINIC | Age: 64
End: 2019-03-28

## 2019-03-28 NOTE — TELEPHONE ENCOUNTER
Two Week Stroke Phone Call  Spoke with the patient  · Admission Date: 3/7/2019  · Discharge Date: 3/9/2019  · Discharge Destination: Home  · Meds reviewed with patient/caregiver?    [x]Yes [] No   o Antiplatelet: Aspirin, Plavix  - Understands purpose     [x]  Yes     []  No     - Understands how to take      [x]  Yes     []  No   Patient had been taking omeprazole with plavix, told patient that omeprazole has shown to decrease the Plavix effectiveness.  Also notified, Aspirin 81mg, Plavix 75mg x1 month then aspirin alone. Patient states understanding all above  Cholesterol Reducing:  - Understands purpose     [x]  Yes     []  No    - Understands how to take      [x]  Yes     []  No   · Is the patient taking all medication as directed?   [x]  Yes  []  No  · Discussed personal risk factors   [x]  Yes []  No    o Smoking or other tobacco use  - Has attempted to quit smoking [x]  Yes     []  No   o High blood pressure   - Has been monitoring BP []  Yes     [x]  No  - Encourage pt to monitor BP at home  - Bp goal <130/80  o High cholesterol   - Review desired LDL goal <70  o Atherosclerosis  - Plaque inside the arteries, “hardening of the arteries”  • Discussed signs and symptoms of stroke and when patient to call 911?   [x]  Yes []  No  o Sudden weakness or numbness of the face, arm, or leg especially on one side of the body  o Sudden confusion, trouble speaking or understanding  o Sudden trouble seeing in one or both eyes   o Sudden trouble walking, dizziness, loss of balance or coordination  o Sudden severe headaches with no known cause    Notified Patient that if any of these symptoms occur to call 911  · Does the patient have any new signs or symptoms of a stroke?   []  Yes     [x]  No  · Does the patietnt have an appointment with PCP?  [x]  Yes     []  No  · Does the patient have 3 month Stroke Clinic appointment?  Office will call to make  · Is the patient currently in therapy, outpatient, or home health?  []   Yes     [x]  No    Needs a referral?      []  Yes     [x]  No   Patient Satisfaction   · How often were you kept up to date with your plan of care?    []Never  [x] Sometimes  [] Usually  [] Always  · When test were ordered how often did someone explain to you the results?    []  Never  [x] Sometimes  [] Usually  [] Always  · Overall how satisfied were you with the stroke care you received at [facility]?   []  Very Dissatisfied [] Dissatisfied   [x] Satisfied [] Very Satisfied

## 2019-05-24 ENCOUNTER — TELEPHONE (OUTPATIENT)
Dept: NEUROLOGY | Facility: CLINIC | Age: 64
End: 2019-05-24

## 2019-05-24 NOTE — TELEPHONE ENCOUNTER
Called patient to schedule 3 month hosp stroke follow up. Unable to schedule patient.  Letter mailed to patient reminding to schedule 3 month follow up.

## 2019-06-10 ENCOUNTER — CALL CENTER PROGRAMS (OUTPATIENT)
Dept: CALL CENTER | Facility: HOSPITAL | Age: 64
End: 2019-06-10

## 2019-06-10 NOTE — OUTREACH NOTE
Stroke [place] Survey      Responses   [facility]   Attempt successful  No   Unsuccessful attempts  Attempt 1          Ryann Hall RN

## 2019-06-11 ENCOUNTER — CALL CENTER PROGRAMS (OUTPATIENT)
Dept: CALL CENTER | Facility: HOSPITAL | Age: 64
End: 2019-06-11

## 2019-06-11 NOTE — OUTREACH NOTE
Stroke [place] Survey      Respons[facility]   [facility]   Attempt successful  No   Unsuccessful attempts  Attempt 2          Ryann Hall RN

## 2019-06-13 ENCOUNTER — CALL CENTER PROGRAMS (OUTPATIENT)
Dept: CALL CENTER | Facility: HOSPITAL | Age: 64
End: 2019-06-13

## 2019-06-13 NOTE — OUTREACH NOTE
Stroke [place] Survey      Responses   [facility]   Attempt successful  No   Unsuccessful attempts  Attempt 3   Call Center [place] Score  7          Ryann Hall RN

## 2021-05-16 VITALS
HEART RATE: 116 BPM | WEIGHT: 151 LBS | SYSTOLIC BLOOD PRESSURE: 129 MMHG | BODY MASS INDEX: 33.97 KG/M2 | HEIGHT: 56 IN | DIASTOLIC BLOOD PRESSURE: 82 MMHG

## 2023-10-26 ENCOUNTER — OFFICE VISIT (OUTPATIENT)
Dept: NEUROSURGERY | Facility: CLINIC | Age: 68
End: 2023-10-26
Payer: MEDICARE

## 2023-10-26 VITALS
SYSTOLIC BLOOD PRESSURE: 121 MMHG | HEIGHT: 56 IN | WEIGHT: 161 LBS | DIASTOLIC BLOOD PRESSURE: 69 MMHG | HEART RATE: 90 BPM | BODY MASS INDEX: 36.22 KG/M2

## 2023-10-26 DIAGNOSIS — M47.816 FACET ARTHRITIS OF LUMBAR REGION: ICD-10-CM

## 2023-10-26 DIAGNOSIS — G89.29 CHRONIC MIDLINE LOW BACK PAIN WITH RIGHT-SIDED SCIATICA: ICD-10-CM

## 2023-10-26 DIAGNOSIS — M54.41 CHRONIC MIDLINE LOW BACK PAIN WITH RIGHT-SIDED SCIATICA: ICD-10-CM

## 2023-10-26 DIAGNOSIS — M48.061 FORAMINAL STENOSIS OF LUMBAR REGION: ICD-10-CM

## 2023-10-26 DIAGNOSIS — M48.061 SPINAL STENOSIS AT L4-L5 LEVEL: Primary | ICD-10-CM

## 2023-10-26 RX ORDER — LOSARTAN POTASSIUM 50 MG/1
50 TABLET ORAL DAILY
COMMUNITY

## 2023-10-26 RX ORDER — MELOXICAM 15 MG/1
15 TABLET ORAL DAILY
COMMUNITY

## 2023-10-26 RX ORDER — SIMVASTATIN 20 MG
20 TABLET ORAL NIGHTLY
COMMUNITY

## 2023-10-26 RX ORDER — CALCIUM CARBONATE 500(1250)
500 TABLET ORAL 2 TIMES DAILY
COMMUNITY

## 2023-10-26 RX ORDER — CITALOPRAM 20 MG/1
20 TABLET ORAL DAILY
COMMUNITY

## 2023-10-26 NOTE — PROGRESS NOTES
"Chief Complaint  Back Pain, Hip Pain (Right ), Leg Pain (Right hip to calf ), Numbness (Right thigh ), and Tingling (Right thigh )    Subjective          Araceli TRACY Cantor who is a 67 y.o. year old female who presents to [facility] for Evaluation of the Spine.     The patient complains of pain located in the Lumbar Spine.  Patients states the pain has been present for 3 years.  The pain came on gradually.  The pain scaled level is 10.  The pain does radiate. Dermatomes are located on right Lumbar at: mostly to the knee, but sometimes to the ankle.  The pain is Intermittent and described as sharp.  The pain is worse at no particular time of day. Patient states  laying down, walking makes the pain better.  Patient states  prolonged sitting, turning on her side while laying down makes the pain worse.    Associated Symptoms Include: Numbness and Tingling in the right thigh. Denies weakness or loss of bowel or bladder control.  Conservative Interventions Include: Oral Steroids that were ineffective. and NSAIDs that were ineffective. Physical therapy in the past was ineffective. SI joint injections were helpful in the past. Chiropractor was ineffecitve.    Was this the result of an injury or accident? : No    History of Previous Spinal Surgery?: No     reports that she has been smoking cigarettes. She has never used smokeless tobacco.    Review of Systems   Musculoskeletal:  Positive for back pain.        Objective   Vital Signs:   /69   Pulse 90   Ht 142.2 cm (56\")   Wt 73 kg (161 lb)   BMI 36.10 kg/m²       Physical Exam  Constitutional:       Appearance: Normal appearance. She is obese.   Pulmonary:      Effort: Pulmonary effort is normal.   Musculoskeletal:         General: Tenderness (midline lumbar, right lumbar and SI joint) present.      Comments: SLR on the right causes pain in the right lower back,  Ronny's testing on the right is positive   Neurological:      " General: No focal deficit present.      Mental Status: She is alert and oriented to person, place, and time.      Sensory: No sensory deficit.      Motor: No weakness.      Deep Tendon Reflexes: Reflexes normal.   Psychiatric:         Mood and Affect: Mood normal.         Behavior: Behavior normal.        Neurologic Exam     Mental Status   Oriented to person, place, and time.        Result Review     I have personally reviewed the MRI of the lumbar spine without contrast from 9/6/2023 which shows multilevel spondylosis and facet arthritis.  There is moderate to severe bilateral foraminal stenosis at L3-L4 without significant central canal narrowing.  At L4-L5 there is likely an annular fissure along with severe bilateral foraminal stenosis and moderate right lateral recess stenosis.  At L5-S1 there is severe left foraminal stenosis with possible compression of the exiting L5 nerve root on the left.     Assessment and Plan    Diagnoses and all orders for this visit:    1. Spinal stenosis at L4-L5 level (Primary)  -     Ambulatory Referral to Pain Management    2. Facet arthritis of lumbar region  -     Ambulatory Referral to Pain Management    3. Foraminal stenosis of lumbar region    4. Chronic midline low back pain with right-sided sciatica  -     Ambulatory Referral to Pain Management    She has pain in the right leg mostly to the knee, but sometimes to the ankle.    She does have some right lateral recess stenosis, mostly from facet arthritis, at the L4-L5 level of the spine. This is the most likely cause of the right leg pain.    We discussed that surgery may help the right leg pain, but I expect it is less likely to help given the non-specific leg pain. She is hopeful to avoid surgery.    She has benefited from SI joint injections in the past. I expect she may benefit from a trial of LESB as well. I will refer her to [facility] in [place] to discuss this treatment.    The patient was counseled on basic  recommendations for the reduction and prevention of back, neck, or spine pain in association with spinal disorders, including: cessation/avoidance of nicotine use, maintenance of a healthy BMI and weight, focusing on building/maintaining core strength through core exercise, and avoidance of activities which worsen the pain. The patient will monitor for changes in symptoms and notify our clinic of these changes as needed.    She will follow-up here PRN.    Follow Up   Return if symptoms worsen or fail to improve.  Patient was given instructions and counseling regarding her condition or for health maintenance advice. Please see specific information pulled into the AVS if appropriate.

## 2023-11-14 ENCOUNTER — TELEPHONE (OUTPATIENT)
Dept: NEUROSURGERY | Facility: OTHER | Age: 68
End: 2023-11-14

## 2023-11-14 NOTE — TELEPHONE ENCOUNTER
Caller: Araceli Cantor    Relationship: Self    Best call back number: [phone] (home) [phone] (work)    What is the medical concern/diagnosis: LUMBAR    What specialty or service is being requested: PAIN MANAGEMENT     What is the provider, practice or medical service name: [facility]    What is the office location: [address] [place] [address]     What is the office phone number: [phone]    Any additional details: PATIENT CALLED IN AND STATED THAT SHE HAS NOT HEARD FROM PAIN MANAGEMENT ON GETTING SCHEDULED- SHE STATED SHOULD BE SENT TO [facility]- REQUESTING IF ORDERS CAN BE SENT- PLEASE ADVISE- THANK YOU.    PATIENT REQUESTING A C/B WHEN ORDERS SENT.

## 2024-03-28 ENCOUNTER — OFFICE VISIT (OUTPATIENT)
Dept: NEUROSURGERY | Facility: CLINIC | Age: 69
End: 2024-03-28
Payer: MEDICARE

## 2024-03-28 VITALS
HEIGHT: 56 IN | HEART RATE: 78 BPM | WEIGHT: 165.4 LBS | BODY MASS INDEX: 37.2 KG/M2 | SYSTOLIC BLOOD PRESSURE: 126 MMHG | DIASTOLIC BLOOD PRESSURE: 75 MMHG

## 2024-03-28 DIAGNOSIS — M54.41 CHRONIC MIDLINE LOW BACK PAIN WITH RIGHT-SIDED SCIATICA: ICD-10-CM

## 2024-03-28 DIAGNOSIS — M47.816 FACET ARTHRITIS OF LUMBAR REGION: ICD-10-CM

## 2024-03-28 DIAGNOSIS — M48.061 FORAMINAL STENOSIS OF LUMBAR REGION: ICD-10-CM

## 2024-03-28 DIAGNOSIS — G89.29 CHRONIC MIDLINE LOW BACK PAIN WITH RIGHT-SIDED SCIATICA: ICD-10-CM

## 2024-03-28 DIAGNOSIS — M48.061 SPINAL STENOSIS AT L4-L5 LEVEL: Primary | ICD-10-CM

## 2024-03-28 PROCEDURE — 1160F RVW MEDS BY RX/DR IN RCRD: CPT | Performed by: PHYSICIAN ASSISTANT

## 2024-03-28 PROCEDURE — 1159F MED LIST DOCD IN RCRD: CPT | Performed by: PHYSICIAN ASSISTANT

## 2024-03-28 RX ORDER — SERTRALINE HYDROCHLORIDE 25 MG/1
25 TABLET, FILM COATED ORAL DAILY
COMMUNITY
Start: 2024-01-31

## 2024-03-28 RX ORDER — GABAPENTIN 100 MG/1
100 CAPSULE ORAL 3 TIMES DAILY
COMMUNITY
Start: 2024-02-27

## 2024-03-28 NOTE — PROGRESS NOTES
Patient being seen for today for Back Pain  .    Subjective    Araceli Cantor is a 68 y.o. female that presents with Back Pain  .    HPI  Previously: Last seen on 10/26/2023 for right leg pain mostly to the knee and sometimes to the ankle.  She had right lateral recess stenosis at L4-L5.  We discussed surgery may help her right leg pain but is less likely to help nonspecific pain in the leg.  She was referred to, pain and spine to discuss SI joint injections versus lumbar epidural steroid injections.  There was plan to follow-up as needed.    Today: She had epidural steroid injections with pain management without significant benefit.    She continues to have right leg pain to the knee, sometimes to the ankle.     reports that she has been smoking cigarettes. She has never used smokeless tobacco.    Review of Systems    Objective   Vitals:    03/28/24 1317   BP: 126/75   Pulse: 78        Physical Exam     Result Review   I have personally reviewed the radiology report for MRI of the lumbar spine without contrast from 9/6/2023 which shows severe left foraminal stenosis at L5-S1.  Right lateral recess stenosis at L4-L5 with severe bilateral foraminal stenosis.  There is moderate severe bilateral foraminal stenosis at L3-L4.     Assessment and Plan {CC Problem List  Visit Diagnosis  ROS  Review (Popup)  Health Maintenance  Quality  BestPractice  Medications  SmartSets  SnapShot Encounters  Media :23}   Diagnoses and all orders for this visit:    1. Spinal stenosis at L4-L5 level (Primary)    2. Facet arthritis of lumbar region    3. Foraminal stenosis of lumbar region    4. Chronic midline low back pain with right-sided sciatica    She has pain in the right leg to the ankle.    She has right lateral recess narrowing and foraminal narrowing bilaterally at L4-L5, which is severe. Then moderate to severe foraminal narrowing at L3-L4.    She will try to get the images on a disc. I will also have the nurse check  in on getting the images restored or transmitted.    We discussed that surgery may help relieve right leg pain, it is unlikely to help pain in the back, less likely to help pain that does not go to the toes.    She has tried and failed LESI trial and PT made her worse.    She will follow-up here in 2 weeks to reassess and discuss surgery with Dr. Polk.  Follow Up {Instructions Charge Capture  Follow-up Communications :23}   Return in about 2 weeks (around 4/11/2024).

## 2024-04-01 ENCOUNTER — TELEPHONE (OUTPATIENT)
Dept: NEUROSURGERY | Facility: OTHER | Age: 69
End: 2024-04-01
Payer: MEDICARE

## 2024-04-01 NOTE — TELEPHONE ENCOUNTER
PATIENT RETURNED CALL TO GET R/S TO SEE STAN TOMORROW- ATTEMPTED W/T- NO ANSWER- PLEASE ADVISE- THANK YOU!

## 2024-04-02 ENCOUNTER — OFFICE VISIT (OUTPATIENT)
Dept: NEUROSURGERY | Facility: CLINIC | Age: 69
End: 2024-04-02
Payer: MEDICARE

## 2024-04-02 VITALS
DIASTOLIC BLOOD PRESSURE: 74 MMHG | BODY MASS INDEX: 37.99 KG/M2 | WEIGHT: 168.9 LBS | SYSTOLIC BLOOD PRESSURE: 122 MMHG | HEIGHT: 56 IN

## 2024-04-02 DIAGNOSIS — M71.30 SYNOVIAL CYST: Primary | ICD-10-CM

## 2024-04-02 NOTE — H&P (VIEW-ONLY)
Araceli Cantor is a 68 y.o. female that presents with Back Pain       She has lower back and right leg pain. The leg pain is greater than the back pain. The pain has been present for several years. She has not had lumbar surgery. She has failed PT, injections, NSAIDs, and gabapentin. She is on aspirin but no longer plavix.     Back Pain  Associated symptoms include numbness.       Review of Systems   Musculoskeletal:  Positive for back pain and myalgias.   Neurological:  Positive for numbness.        Vitals:    04/02/24 1249   BP: 122/74        Physical Exam  Constitutional:       Comments: BMI 37   Pulmonary:      Effort: Pulmonary effort is normal.   Neurological:      Mental Status: She is alert.      Comments: She has decreased sensation to light touch in the right L5 and S1 distribution  Strength normal in the BLE   Psychiatric:         Mood and Affect: Mood normal.        I personally reviewed the patient's MRI scan which shows a right L4-5 lateral recess narrowing. I think it looks to be a synovial cyst.      Assessment and Plan {CC Problem List  Visit Diagnosis  ROS  Review (Popup)  Health Maintenance  Quality  BestPractice  Medications  SmartSets  SnapShot Encounters  Media :23}   Problem List Items Addressed This Visit    None  Visit Diagnoses       Synovial cyst    -  Primary        I think her right leg pain would likely respond a right approach for L4-5 minimally invasive laminectomy with synovial cyst resection.     Follow Up {Instructions Charge Capture  Follow-up Communications :23}   No follow-ups on file.

## 2024-04-15 NOTE — PRE-PROCEDURE INSTRUCTIONS
IMPORTANT INSTRUCTIONS - PRE-ADMISSION TESTING  DO NOT EAT OR CHEW anything after midnight the night before your procedure.    You may have SIPS NO RED CLEAR liquids up to ___2___ hours prior to ARRIVAL time.   Take the following medications the morning of your procedure with JUST A SIP OF WATER:  _GABAPENTIN, OMEPRAZOLE, SERTRALINE______________________________________________________________________________________________________________________________________________________________________________________    DO NOT BRING your medications to the hospital with you, UNLESS something has changed since your PRE-Admission Testing appointment.  Hold all vitamins, supplements, and NSAIDS (Non- steroidal anti-inflammatory meds) for one week prior to surgery (you MAY take Tylenol or Acetaminophen).  If you are diabetic, check your blood sugar the morning of your procedure. If it is less than 70 or if you are feeling symptomatic, call the following number for further instructions: [phone]  Use your inhalers/nebulizers as usual, the morning of your procedure. BRING YOUR INHALERS with you.   Bring your CPAP or BIPAP to hospital, ONLY IF YOU WILL BE SPENDING THE NIGHT.   Make sure you have a ride home and have someone who will stay with you the day of your procedure after you go home.  If you have any questions, please call your Pre-Admission Testing Nurse, __KATIE______________ at [phone]________.   Per anesthesia request, do not smoke for 24 hours before your procedure or as instructed by your surgeon.    BATHING INSTRUCTIONS GIVEN. NO JEWELRY DAY OF PROCEDURE. NO NAIL POLISH UPPER OR LOWER EXTREMITIES.  ENTRANCE A, ELEVATOR A, 3RD FLOOR   CASH, CHECK, OR CARD FOR MEDS TO BED IF INDICATED AT DISCHARGE  NO SMOKING 24 HOURS PRIOR TO PROCEDURE

## 2024-04-17 ENCOUNTER — APPOINTMENT (OUTPATIENT)
Dept: GENERAL RADIOLOGY | Facility: HOSPITAL | Age: 69
End: 2024-04-17
Payer: MEDICARE

## 2024-04-17 ENCOUNTER — HOSPITAL ENCOUNTER (OUTPATIENT)
Facility: HOSPITAL | Age: 69
Setting detail: HOSPITAL OUTPATIENT SURGERY
Discharge: HOME | End: 2024-04-17
Attending: NEUROLOGICAL SURGERY | Admitting: NEUROLOGICAL SURGERY
Payer: MEDICARE

## 2024-04-17 ENCOUNTER — ANESTHESIA (OUTPATIENT)
Dept: PERIOP | Facility: HOSPITAL | Age: 69
End: 2024-04-17
Payer: MEDICARE

## 2024-04-17 ENCOUNTER — ANESTHESIA EVENT (OUTPATIENT)
Dept: PERIOP | Facility: HOSPITAL | Age: 69
End: 2024-04-17
Payer: MEDICARE

## 2024-04-17 VITALS
OXYGEN SATURATION: 93 % | RESPIRATION RATE: 18 BRPM | HEART RATE: 76 BPM | SYSTOLIC BLOOD PRESSURE: 107 MMHG | BODY MASS INDEX: 37.05 KG/M2 | TEMPERATURE: 97.5 F | WEIGHT: 171.74 LBS | DIASTOLIC BLOOD PRESSURE: 59 MMHG | HEIGHT: 57 IN

## 2024-04-17 DIAGNOSIS — M71.30 SYNOVIAL CYST: ICD-10-CM

## 2024-04-17 PROCEDURE — 25810000003 LACTATED RINGERS PER 1000 ML: Performed by: ANESTHESIOLOGY

## 2024-04-17 PROCEDURE — 63267 EXCISE INTRSPINL LESION LMBR: CPT | Performed by: SPECIALIST/TECHNOLOGIST, OTHER

## 2024-04-17 PROCEDURE — 25010000002 ONDANSETRON PER 1 MG: Performed by: NURSE ANESTHETIST, CERTIFIED REGISTERED

## 2024-04-17 PROCEDURE — 69990 MICROSURGERY ADD-ON: CPT | Performed by: NEUROLOGICAL SURGERY

## 2024-04-17 PROCEDURE — 25010000002 CEFAZOLIN SODIUM 2 G RECONSTITUTED SOLUTION: Performed by: NEUROLOGICAL SURGERY

## 2024-04-17 PROCEDURE — 25010000002 METHYLPREDNISOLONE PER 40 MG: Performed by: NEUROLOGICAL SURGERY

## 2024-04-17 PROCEDURE — 76000 FLUOROSCOPY <1 HR PHYS/QHP: CPT

## 2024-04-17 PROCEDURE — 25010000002 MEPERIDINE PER 100 MG: Performed by: ANESTHESIOLOGY

## 2024-04-17 PROCEDURE — 25010000002 DEXAMETHASONE PER 1 MG: Performed by: NURSE ANESTHETIST, CERTIFIED REGISTERED

## 2024-04-17 PROCEDURE — 25010000002 PROPOFOL 10 MG/ML EMULSION: Performed by: NURSE ANESTHETIST, CERTIFIED REGISTERED

## 2024-04-17 PROCEDURE — 93005 ELECTROCARDIOGRAM TRACING: CPT | Performed by: ANESTHESIOLOGY

## 2024-04-17 PROCEDURE — 25010000002 FENTANYL CITRATE (PF) 50 MCG/ML SOLUTION: Performed by: NURSE ANESTHETIST, CERTIFIED REGISTERED

## 2024-04-17 PROCEDURE — 63267 EXCISE INTRSPINL LESION LMBR: CPT | Performed by: NEUROLOGICAL SURGERY

## 2024-04-17 PROCEDURE — 25010000002 MIDAZOLAM PER 1MG: Performed by: ANESTHESIOLOGY

## 2024-04-17 RX ORDER — PROMETHAZINE HYDROCHLORIDE 12.5 MG/1
25 TABLET ORAL ONCE AS NEEDED
Status: DISCONTINUED | OUTPATIENT
Start: 2024-04-17 | End: 2024-04-17 | Stop reason: HOSPADM

## 2024-04-17 RX ORDER — LIDOCAINE HYDROCHLORIDE AND EPINEPHRINE 10; 10 MG/ML; UG/ML
INJECTION, SOLUTION INFILTRATION; PERINEURAL AS NEEDED
Status: DISCONTINUED | OUTPATIENT
Start: 2024-04-17 | End: 2024-04-17 | Stop reason: HOSPADM

## 2024-04-17 RX ORDER — BUPIVACAINE HCL/0.9 % NACL/PF 0.1 %
2 PLASTIC BAG, INJECTION (ML) EPIDURAL ONCE
Status: COMPLETED | OUTPATIENT
Start: 2024-04-17 | End: 2024-04-17

## 2024-04-17 RX ORDER — MIDAZOLAM HYDROCHLORIDE 2 MG/2ML
2 INJECTION, SOLUTION INTRAMUSCULAR; INTRAVENOUS ONCE
Status: COMPLETED | OUTPATIENT
Start: 2024-04-17 | End: 2024-04-17

## 2024-04-17 RX ORDER — OXYCODONE HYDROCHLORIDE 5 MG/1
5 TABLET ORAL
Status: COMPLETED | OUTPATIENT
Start: 2024-04-17 | End: 2024-04-17

## 2024-04-17 RX ORDER — MAGNESIUM HYDROXIDE 1200 MG/15ML
LIQUID ORAL AS NEEDED
Status: DISCONTINUED | OUTPATIENT
Start: 2024-04-17 | End: 2024-04-17 | Stop reason: HOSPADM

## 2024-04-17 RX ORDER — SODIUM CHLORIDE 9 MG/ML
40 INJECTION, SOLUTION INTRAVENOUS AS NEEDED
Status: DISCONTINUED | OUTPATIENT
Start: 2024-04-17 | End: 2024-04-17 | Stop reason: HOSPADM

## 2024-04-17 RX ORDER — PROPOFOL 10 MG/ML
VIAL (ML) INTRAVENOUS AS NEEDED
Status: DISCONTINUED | OUTPATIENT
Start: 2024-04-17 | End: 2024-04-17 | Stop reason: SURG

## 2024-04-17 RX ORDER — ONDANSETRON 2 MG/ML
INJECTION INTRAMUSCULAR; INTRAVENOUS AS NEEDED
Status: DISCONTINUED | OUTPATIENT
Start: 2024-04-17 | End: 2024-04-17 | Stop reason: SURG

## 2024-04-17 RX ORDER — ACETAMINOPHEN 500 MG
1000 TABLET ORAL ONCE
Status: COMPLETED | OUTPATIENT
Start: 2024-04-17 | End: 2024-04-17

## 2024-04-17 RX ORDER — ONDANSETRON 2 MG/ML
4 INJECTION INTRAMUSCULAR; INTRAVENOUS ONCE AS NEEDED
Status: DISCONTINUED | OUTPATIENT
Start: 2024-04-17 | End: 2024-04-17 | Stop reason: HOSPADM

## 2024-04-17 RX ORDER — HYDROCODONE BITARTRATE AND ACETAMINOPHEN 5; 325 MG/1; MG/1
1 TABLET ORAL EVERY 6 HOURS PRN
Qty: 20 TABLET | Refills: 0 | Status: SHIPPED | OUTPATIENT
Start: 2024-04-17

## 2024-04-17 RX ORDER — SODIUM CHLORIDE, SODIUM LACTATE, POTASSIUM CHLORIDE, CALCIUM CHLORIDE 600; 310; 30; 20 MG/100ML; MG/100ML; MG/100ML; MG/100ML
9 INJECTION, SOLUTION INTRAVENOUS CONTINUOUS PRN
Status: DISCONTINUED | OUTPATIENT
Start: 2024-04-17 | End: 2024-04-17 | Stop reason: HOSPADM

## 2024-04-17 RX ORDER — ROCURONIUM BROMIDE 10 MG/ML
INJECTION, SOLUTION INTRAVENOUS AS NEEDED
Status: DISCONTINUED | OUTPATIENT
Start: 2024-04-17 | End: 2024-04-17 | Stop reason: SURG

## 2024-04-17 RX ORDER — LIDOCAINE HYDROCHLORIDE 20 MG/ML
INJECTION, SOLUTION EPIDURAL; INFILTRATION; INTRACAUDAL; PERINEURAL AS NEEDED
Status: DISCONTINUED | OUTPATIENT
Start: 2024-04-17 | End: 2024-04-17 | Stop reason: SURG

## 2024-04-17 RX ORDER — PROMETHAZINE HYDROCHLORIDE 25 MG/1
25 SUPPOSITORY RECTAL ONCE AS NEEDED
Status: DISCONTINUED | OUTPATIENT
Start: 2024-04-17 | End: 2024-04-17 | Stop reason: HOSPADM

## 2024-04-17 RX ORDER — DEXAMETHASONE SODIUM PHOSPHATE 4 MG/ML
INJECTION, SOLUTION INTRA-ARTICULAR; INTRALESIONAL; INTRAMUSCULAR; INTRAVENOUS; SOFT TISSUE AS NEEDED
Status: DISCONTINUED | OUTPATIENT
Start: 2024-04-17 | End: 2024-04-17 | Stop reason: SURG

## 2024-04-17 RX ORDER — PHENYLEPHRINE HCL IN 0.9% NACL 1 MG/10 ML
SYRINGE (ML) INTRAVENOUS AS NEEDED
Status: DISCONTINUED | OUTPATIENT
Start: 2024-04-17 | End: 2024-04-17 | Stop reason: SURG

## 2024-04-17 RX ORDER — METHYLPREDNISOLONE ACETATE 40 MG/ML
INJECTION, SUSPENSION INTRA-ARTICULAR; INTRALESIONAL; INTRAMUSCULAR; SOFT TISSUE AS NEEDED
Status: DISCONTINUED | OUTPATIENT
Start: 2024-04-17 | End: 2024-04-17 | Stop reason: HOSPADM

## 2024-04-17 RX ORDER — FENTANYL CITRATE 50 UG/ML
INJECTION, SOLUTION INTRAMUSCULAR; INTRAVENOUS AS NEEDED
Status: DISCONTINUED | OUTPATIENT
Start: 2024-04-17 | End: 2024-04-17 | Stop reason: SURG

## 2024-04-17 RX ORDER — MEPERIDINE HYDROCHLORIDE 25 MG/ML
25 INJECTION INTRAMUSCULAR; INTRAVENOUS; SUBCUTANEOUS ONCE
Status: COMPLETED | OUTPATIENT
Start: 2024-04-17 | End: 2024-04-17

## 2024-04-17 RX ADMIN — Medication 200 MCG: at 11:59

## 2024-04-17 RX ADMIN — Medication 200 MCG: at 12:04

## 2024-04-17 RX ADMIN — DEXAMETHASONE SODIUM PHOSPHATE 4 MG: 4 INJECTION, SOLUTION INTRAMUSCULAR; INTRAVENOUS at 11:20

## 2024-04-17 RX ADMIN — Medication 200 MCG: at 12:14

## 2024-04-17 RX ADMIN — OXYCODONE HYDROCHLORIDE 5 MG: 5 TABLET ORAL at 13:47

## 2024-04-17 RX ADMIN — SODIUM CHLORIDE, POTASSIUM CHLORIDE, SODIUM LACTATE AND CALCIUM CHLORIDE 9 ML/HR: 600; 310; 30; 20 INJECTION, SOLUTION INTRAVENOUS at 09:36

## 2024-04-17 RX ADMIN — Medication 2 G: at 11:22

## 2024-04-17 RX ADMIN — ROCURONIUM BROMIDE 50 MG: 10 INJECTION, SOLUTION INTRAVENOUS at 11:17

## 2024-04-17 RX ADMIN — Medication 200 MCG: at 11:55

## 2024-04-17 RX ADMIN — SODIUM CHLORIDE, POTASSIUM CHLORIDE, SODIUM LACTATE AND CALCIUM CHLORIDE: 600; 310; 30; 20 INJECTION, SOLUTION INTRAVENOUS at 12:55

## 2024-04-17 RX ADMIN — PROPOFOL 150 MG: 10 INJECTION, EMULSION INTRAVENOUS at 11:17

## 2024-04-17 RX ADMIN — FENTANYL CITRATE 100 MCG: 50 INJECTION, SOLUTION INTRAMUSCULAR; INTRAVENOUS at 11:45

## 2024-04-17 RX ADMIN — MIDAZOLAM HYDROCHLORIDE 2 MG: 1 INJECTION, SOLUTION INTRAMUSCULAR; INTRAVENOUS at 10:46

## 2024-04-17 RX ADMIN — ACETAMINOPHEN 1000 MG: 500 TABLET ORAL at 09:36

## 2024-04-17 RX ADMIN — LIDOCAINE HYDROCHLORIDE 60 MG: 20 INJECTION, SOLUTION INTRAVENOUS at 11:17

## 2024-04-17 RX ADMIN — ONDANSETRON HYDROCHLORIDE 4 MG: 2 SOLUTION INTRAMUSCULAR; INTRAVENOUS at 11:20

## 2024-04-17 RX ADMIN — OXYCODONE HYDROCHLORIDE 5 MG: 5 TABLET ORAL at 13:18

## 2024-04-17 RX ADMIN — MEPERIDINE HYDROCHLORIDE 25 MG: 25 INJECTION INTRAMUSCULAR; INTRAVENOUS; SUBCUTANEOUS at 09:36

## 2024-04-17 RX ADMIN — Medication 200 MCG: at 12:09

## 2024-04-17 RX ADMIN — Medication 200 MCG: at 12:55

## 2024-04-17 NOTE — ANESTHESIA PREPROCEDURE EVALUATION
Anesthesia Evaluation     Patient summary reviewed and Nursing notes reviewed                Airway   Mallampati: I  TM distance: >3 FB  Neck ROM: full  No difficulty expected  Dental      Pulmonary - negative pulmonary ROS and normal exam    breath sounds clear to auscultation  Cardiovascular - normal exam    Rhythm: regular  Rate: normal    (+) hypertension, hyperlipidemia      Neuro/Psych  (+) CVA  GI/Hepatic/Renal/Endo    (+) obesity    Musculoskeletal     (+) back pain  Abdominal    Substance History - negative use     OB/GYN negative ob/gyn ROS         Other        ROS/Med Hx Other: No residual symptoms from CVA              Anesthesia Plan    ASA 3     general     intravenous induction     Anesthetic plan, risks, benefits, and alternatives have been provided, discussed and informed consent has been obtained with: patient.    CODE STATUS:

## 2024-04-17 NOTE — DISCHARGE INSTRUCTIONS
DISCHARGE INSTRUCTIONS  DISCECTOMY/ LAMINECTOMY  [x] MINIMALLY INVASIVE      For your surgery you had:  General anesthesia (you may have a sore throat for the first 24 hours)    You may experience dizziness, drowsiness, or light-headedness for several hours following surgery  Do not stay alone today or tonight.  Limit your activity for 24 hours.  You should not drive, operate machinery, drink alcohol, or sign legally binding documents for 24 hours or while you are taking pain medication.    Activity  For the first two weeks following surgery, remain close to home and do not do any lifting, bending or strenuous activity.  Walking is the best exercise and you can increase the amount you walk as you feel like it.  Riding in a car for short distances (15-20 minutes) is okay but do not drive until you are off all narcotic medications.  If you have a sedentary job, you may resume work as soon as you feel like it (this is rarely less than one week).    Pain Control  Take your pain medicines as needed and as prescribed.  As you are feeling better, you can decrease the prescribed pain medication and take over-the-counter medications such as Tylenol or Ibuprofen.  The prescribed pain medicines tend to be constipating so it is important to eat a well-balanced diet and take a stool softener if recommended by the doctor.  Activity such as walking also helps keep your bowels regular.    Last dose of pain medication was given at:    Oxy 5 mg x2 doses @ 1:20 and 1:45  Incision Care  Your sutures are under the skin and will dissolve in time.  You may shower tomorrow, no submerging of incision for 2 weeks.       [x] You have a clear dressing, which you should remove in 3-4 days.  Beneath this dressing are steri-strips that will peel off over time.  If these steri-strips have not peeled off in 10-14 days, you may remove them.    Gently washing your incision with mild soap and rinsing with water during your shower is all you need  to do to care for the incision.  Do not scrub the incision or sit in the bathtub.  Check your incision site each day to see how it looks.  Some redness and bruising is normal for the first few days.    NOTIFY YOUR DOCTOR IF YOU EXPERIENCE ANY OF THE FOLLOWING:   You have a fever over 100.8o Fahrenheit orally  Shaking chills  Your incision is red, hot to touch, or has excessive drainage  Your incision starts to separate  Increase in bleeding or bleeding that is excessive  Nausea, vomiting and/or pain not controlled by prescribed medications  Unable to urinate in 6 hours after surgery  You may contact 's clinic at [phone] with any questions or concerns.  If unable to reach your doctor, please go to the closest emergency room.    SPECIAL INSTRUCTIONS:  1) No driving for 5-7 days and not taking narcotics.   2) May shower tomorrow no submerging incision.   3) Do not lift / push / pull more than 8-10 lbs.   4) Minimize bending/twisting at the waist and jarring activity such as lawnmower.

## 2024-04-17 NOTE — ANESTHESIA POSTPROCEDURE EVALUATION
Patient: Araceli Cantor    Procedure Summary       Date: 04/17/24 Room / Location: [facility] OR 05 / [facility] MAIN OR    Anesthesia Start: 1112 Anesthesia Stop: 1257    Procedure: MINIMALLY INVASIVE LUMBAR LAMINECTOMY WITH SYNOVIAL CYST RESECTION, RIGHT APPROACH, LUMBAR 4-LUMBAR 5 (Right: Back) Diagnosis:       Synovial cyst      (Synovial cyst [M71.30])    Surgeons: Gregory Polk MD Provider: Shemar Watkins MD    Anesthesia Type: general ASA Status: 3            Anesthesia Type: general    Vitals  Vitals Value Taken Time   BP 94/61 04/17/24 1330   Temp 36.4 °C (97.6 °F) 04/17/24 1330   Pulse 76 04/17/24 1331   Resp 16 04/17/24 1330   SpO2 94 % 04/17/24 1331   Vitals shown include unfiled device data.        Post Anesthesia Care and Evaluation    Patient location during evaluation: bedside  Patient participation: complete - patient participated  Level of consciousness: awake  Pain management: adequate    Airway patency: patent  PONV Status: none  Cardiovascular status: acceptable and stable  Respiratory status: acceptable  Hydration status: acceptable    Comments: An Anesthesiologist personally participated in the most demanding procedures (including induction and emergence if applicable) in the anesthesia plan, monitored the course of anesthesia administration at frequent intervals and remained physically present and available for immediate diagnosis and treatment of emergencies.

## 2024-04-17 NOTE — OP NOTE
LUMBAR LAMINECTOMY DISCECTOMY MINIMALLY INVASIVE  Procedure Report    Patient Name:  Araceli Cantor  YOB: 1955    Date of Surgery:  4/17/2024     Indications: Right lumbar 4-lumbar 5 synovial cyst with radiculopathy    Pre-op Diagnosis:   Synovial cyst [M71.30]       Post-Op Diagnosis Codes:     * Synovial cyst [M71.30]    Procedure/CPT® Codes:      Procedure(s):  MINIMALLY INVASIVE LUMBAR LAMINECTOMY WITH SYNOVIAL CYST RESECTION, RIGHT APPROACH, LUMBAR 4-LUMBAR 5    Staff:  Surgeon(s):  Gregory Polk MD    Assistant: Ella Garcia RN CSA    Anesthesia: General    Estimated Blood Loss: The mL      Specimen:          Lamina, ligament and cyst capsule (none to pathology)        Findings: Large cystic structure very firmly attached to the dura    Complications: No apparent intraoperative complications    Description of Procedure: After informed consent was obtained, the patient was brought to the operating room.  After induction of adequate general endotracheal anesthesia the patient was placed in the prone position on the Jam table utilizing the Dmitri frame.  All pressure points were padded.  The back was prepped and draped in the typical fashion.  The midline was marked and a line approximately 2-1/2 cm to the right of midline was drawn.  On this line the L4-5 interspace was localized using a spinal needle and the C arm.  A 2 cm incision was then made over the level.  The Boss tubular retractor system was used to dilate the tissue docking at L4-5.  The level was again confirmed with fluoroscopy.  The microscope was brought in and used for the remainder of the case for improved magnification and illumination.  The lamina was cleared of soft tissue and the Kerrison punches were used to remove the lamina above the insertion of the ligamentum flavum.  The ligament was identified and was very adherent to the spinal sac.  This was resected centrally to below the area of the firm attachment.   Dissector was then used to dissect the ligament superiorly and the ligament was undercut using a 2 and 3 mm Kerrison punch undercutting the medial facet and the cyst capsule in the process.  A small amount of the superior cyst capsule was left on the spinal sac as it was difficult to get off without high risk of spinal fluid leak.  The medial facet was undercut to assure no additional attachments to the cyst.  After decompression a ball probe passed distally along the L5 nerve root.  Hemostasis was obtained using combination of the bipolar electrocautery as well as Gelfoam with thrombin.  The wound was irrigated with normal saline.  40 mg of Depo-Medrol placed over spinal sac and the L5 nerve root.    The tubular retractor was removed and hemostasis assured in the soft tissue.  The incision was reapproximated using interrupted 0 Vicryl in the fascia and a 2-0 Vicryl in the subcutaneous tissue.  The wound was dressed with Mastisol, Steri-Strips, Telfa and a Tegaderm.     Assistant: Ella Garcia RN CSA  was responsible for performing the following activities: Retraction, Suction, Irrigation, Closing, and Placing Dressing and their skilled assistance was necessary for the success of this case.    Gregory Polk MD     Date: 4/17/2024  Time: 12:47 EDT

## 2024-04-19 LAB
QT INTERVAL: 328 MS
QTC INTERVAL: 418 MS

## 2024-05-09 ENCOUNTER — OFFICE VISIT (OUTPATIENT)
Dept: NEUROSURGERY | Facility: CLINIC | Age: 69
End: 2024-05-09
Payer: MEDICARE

## 2024-05-09 VITALS
HEIGHT: 57 IN | BODY MASS INDEX: 37.11 KG/M2 | DIASTOLIC BLOOD PRESSURE: 60 MMHG | HEART RATE: 86 BPM | WEIGHT: 172 LBS | SYSTOLIC BLOOD PRESSURE: 116 MMHG

## 2024-05-09 DIAGNOSIS — Z98.890 STATUS POST LUMBAR SURGERY: ICD-10-CM

## 2024-05-09 DIAGNOSIS — M48.061 SPINAL STENOSIS AT L4-L5 LEVEL: ICD-10-CM

## 2024-05-09 DIAGNOSIS — M71.30 SYNOVIAL CYST: Primary | ICD-10-CM

## 2024-05-09 PROCEDURE — 1160F RVW MEDS BY RX/DR IN RCRD: CPT | Performed by: PHYSICIAN ASSISTANT

## 2024-05-09 PROCEDURE — 1159F MED LIST DOCD IN RCRD: CPT | Performed by: PHYSICIAN ASSISTANT

## 2024-05-09 PROCEDURE — 99024 POSTOP FOLLOW-UP VISIT: CPT | Performed by: PHYSICIAN ASSISTANT

## 2024-05-15 ENCOUNTER — TELEPHONE (OUTPATIENT)
Dept: NEUROSURGERY | Facility: CLINIC | Age: 69
End: 2024-05-15
Payer: MEDICARE

## 2024-05-15 NOTE — TELEPHONE ENCOUNTER
Caller: Araceli Cantor    Relationship: Self    Best call back number: [phone]    What form or medical record are you requesting: RETURN TO WORK LETTER 5-30-24 WITH NO RESTRICTIONS    Who is requesting this form or medical record from you: EMPLOYER    How would you like to receive the form or medical records (pick-up, mail, fax): MAILED TO PT ADDRESS    Timeframe paperwork needed: ASAP    Additional notes: PER PT EMPLOYER WILL NOT ALLOW HER TO WORK WITH RESTRICTIONS. PATIENT WANTS TO RETURN TO WORK 5-30-24 WITH NO RESTRICTIONS BUT STATES SHE DOES HAVE SOME WHAT OF A PHYSICAL JOB WITH BENDING, TWISTING AND WALKING. STATES NO HEAVY LIFTING.     PLEASE CALL PT TO ADVISE WHEN LETTER IS MAILED.     THANK YOU,

## 2024-05-15 NOTE — TELEPHONE ENCOUNTER
Araceli has been notified that the letter has been wrote and is going to be mailed out tomorrow, 5/16/2024

## (undated) DEVICE — STERILE POLYISOPRENE POWDER-FREE SURGICAL GLOVES: Brand: PROTEXIS

## (undated) DEVICE — SLV SCD KN/LEN ADJ EXPRSS BLENDED MD 1P/U

## (undated) DEVICE — LAMINECTOMY CERVICAL DISC-LF: Brand: MEDLINE INDUSTRIES, INC.

## (undated) DEVICE — ANTIBACTERIAL UNDYED BRAIDED (POLYGLACTIN 910), SYNTHETIC ABSORBABLE SUTURE: Brand: COATED VICRYL

## (undated) DEVICE — GLV SURG BIOGEL LTX PF 7 1/2

## (undated) DEVICE — GAMMEX® NON-LATEX SIZE 7.5, STERILE NEOPRENE POWDER-FREE SURGICAL GLOVE: Brand: GAMMEX

## (undated) DEVICE — STERILE POLYISOPRENE POWDER-FREE SURGICAL GLOVES WITH EMOLLIENT COATING: Brand: PROTEXIS

## (undated) DEVICE — SUT VIC 0/0 UR6 27IN DYED J603H

## (undated) DEVICE — DRP MICROSCP LECIA W/CLEARLENS 137X381CM